# Patient Record
Sex: FEMALE | Race: WHITE | Employment: OTHER
[De-identification: names, ages, dates, MRNs, and addresses within clinical notes are randomized per-mention and may not be internally consistent; named-entity substitution may affect disease eponyms.]

---

## 2017-06-03 ENCOUNTER — HEALTH MAINTENANCE LETTER (OUTPATIENT)
Age: 37
End: 2017-06-03

## 2018-04-26 ENCOUNTER — TRANSFERRED RECORDS (OUTPATIENT)
Dept: HEALTH INFORMATION MANAGEMENT | Facility: CLINIC | Age: 38
End: 2018-04-26

## 2018-05-04 ENCOUNTER — ANESTHESIA EVENT (OUTPATIENT)
Dept: SURGERY | Facility: CLINIC | Age: 38
End: 2018-05-04

## 2018-05-04 ENCOUNTER — HOSPITAL ENCOUNTER (OUTPATIENT)
Dept: SURGERY | Facility: CLINIC | Age: 38
Discharge: HOME | End: 2018-05-04
Attending: PSYCHIATRY & NEUROLOGY | Admitting: PSYCHIATRY & NEUROLOGY
Payer: MEDICARE

## 2018-05-04 ENCOUNTER — ANESTHESIA (OUTPATIENT)
Dept: SURGERY | Facility: CLINIC | Age: 38
End: 2018-05-04

## 2018-05-04 VITALS
SYSTOLIC BLOOD PRESSURE: 101 MMHG | BODY MASS INDEX: 37.67 KG/M2 | OXYGEN SATURATION: 94 % | WEIGHT: 240 LBS | HEART RATE: 73 BPM | HEIGHT: 67 IN | TEMPERATURE: 97.5 F | RESPIRATION RATE: 21 BRPM | DIASTOLIC BLOOD PRESSURE: 78 MMHG

## 2018-05-04 DIAGNOSIS — F33.1 MODERATE EPISODE OF RECURRENT MAJOR DEPRESSIVE DISORDER (H): ICD-10-CM

## 2018-05-04 PROCEDURE — 25000128 H RX IP 250 OP 636: Performed by: PSYCHIATRY & NEUROLOGY

## 2018-05-04 PROCEDURE — 25000125 ZZHC RX 250: Performed by: NURSE ANESTHETIST, CERTIFIED REGISTERED

## 2018-05-04 PROCEDURE — 25000128 H RX IP 250 OP 636: Performed by: NURSE ANESTHETIST, CERTIFIED REGISTERED

## 2018-05-04 PROCEDURE — 37000008 ZZH ANESTHESIA TECHNICAL FEE, 1ST 30 MIN

## 2018-05-04 PROCEDURE — 25000128 H RX IP 250 OP 636: Performed by: ANESTHESIOLOGY

## 2018-05-04 PROCEDURE — 40000010 ZZH STATISTIC ANES STAT CODE-CRNA PER MINUTE

## 2018-05-04 PROCEDURE — 93005 ELECTROCARDIOGRAM TRACING: CPT

## 2018-05-04 PROCEDURE — 93010 ELECTROCARDIOGRAM REPORT: CPT | Performed by: INTERNAL MEDICINE

## 2018-05-04 PROCEDURE — 25000125 ZZHC RX 250: Performed by: ANESTHESIOLOGY

## 2018-05-04 PROCEDURE — 90870 ELECTROCONVULSIVE THERAPY: CPT

## 2018-05-04 RX ORDER — ONDANSETRON 2 MG/ML
4 INJECTION INTRAMUSCULAR; INTRAVENOUS
Status: CANCELLED
Start: 2018-05-04

## 2018-05-04 RX ORDER — KETOROLAC TROMETHAMINE 30 MG/ML
30 INJECTION, SOLUTION INTRAMUSCULAR; INTRAVENOUS
Status: CANCELLED
Start: 2018-05-04

## 2018-05-04 RX ORDER — KETOROLAC TROMETHAMINE 30 MG/ML
15 INJECTION, SOLUTION INTRAMUSCULAR; INTRAVENOUS ONCE
Status: COMPLETED | OUTPATIENT
Start: 2018-05-04 | End: 2018-05-04

## 2018-05-04 RX ORDER — DICYCLOMINE HYDROCHLORIDE 10 MG/1
10 CAPSULE ORAL EVERY EVENING
Status: ON HOLD | COMMUNITY
End: 2020-04-23

## 2018-05-04 RX ORDER — EPINEPHRINE 0.3 MG/.3ML
0.3 INJECTION SUBCUTANEOUS PRN
Status: ON HOLD | COMMUNITY
End: 2020-04-23

## 2018-05-04 RX ORDER — ONDANSETRON 2 MG/ML
4 INJECTION INTRAMUSCULAR; INTRAVENOUS
Status: DISCONTINUED | OUTPATIENT
Start: 2018-05-04 | End: 2018-05-05 | Stop reason: HOSPADM

## 2018-05-04 RX ORDER — SODIUM CHLORIDE, SODIUM LACTATE, POTASSIUM CHLORIDE, CALCIUM CHLORIDE 600; 310; 30; 20 MG/100ML; MG/100ML; MG/100ML; MG/100ML
500 INJECTION, SOLUTION INTRAVENOUS CONTINUOUS
Status: DISCONTINUED | OUTPATIENT
Start: 2018-05-04 | End: 2018-05-05 | Stop reason: HOSPADM

## 2018-05-04 RX ORDER — KETOROLAC TROMETHAMINE 30 MG/ML
30 INJECTION, SOLUTION INTRAMUSCULAR; INTRAVENOUS
Status: DISCONTINUED | OUTPATIENT
Start: 2018-05-04 | End: 2018-05-05 | Stop reason: HOSPADM

## 2018-05-04 RX ORDER — ALBUTEROL SULFATE 90 UG/1
2 AEROSOL, METERED RESPIRATORY (INHALATION) EVERY 4 HOURS PRN
Status: ON HOLD | COMMUNITY
End: 2020-04-23

## 2018-05-04 RX ORDER — FLUTICASONE PROPIONATE AND SALMETEROL XINAFOATE 45; 21 UG/1; UG/1
2 AEROSOL, METERED RESPIRATORY (INHALATION) DAILY
Status: ON HOLD | COMMUNITY
End: 2020-04-23

## 2018-05-04 RX ORDER — KETOROLAC TROMETHAMINE 15 MG/ML
15 INJECTION, SOLUTION INTRAMUSCULAR; INTRAVENOUS
Status: CANCELLED
Start: 2018-05-04

## 2018-05-04 RX ADMIN — ONDANSETRON 4 MG: 2 INJECTION INTRAMUSCULAR; INTRAVENOUS at 07:00

## 2018-05-04 RX ADMIN — SUCCINYLCHOLINE CHLORIDE 40 MG: 20 INJECTION, SOLUTION INTRAMUSCULAR; INTRAVENOUS; PARENTERAL at 06:58

## 2018-05-04 RX ADMIN — KETOROLAC TROMETHAMINE 15 MG: 30 INJECTION, SOLUTION INTRAMUSCULAR at 06:58

## 2018-05-04 RX ADMIN — LIDOCAINE HYDROCHLORIDE 1 ML: 10 INJECTION, SOLUTION EPIDURAL; INFILTRATION; INTRACAUDAL; PERINEURAL at 06:47

## 2018-05-04 RX ADMIN — METHOHEXITAL SODIUM 100 MG: 500 INJECTION, POWDER, LYOPHILIZED, FOR SOLUTION INTRAMUSCULAR; INTRAVENOUS; RECTAL at 06:55

## 2018-05-04 RX ADMIN — METHOHEXITAL SODIUM 40 MG: 500 INJECTION, POWDER, LYOPHILIZED, FOR SOLUTION INTRAMUSCULAR; INTRAVENOUS; RECTAL at 06:58

## 2018-05-04 RX ADMIN — SUCCINYLCHOLINE CHLORIDE 100 MG: 20 INJECTION, SOLUTION INTRAMUSCULAR; INTRAVENOUS; PARENTERAL at 06:55

## 2018-05-04 RX ADMIN — SODIUM CHLORIDE, POTASSIUM CHLORIDE, SODIUM LACTATE AND CALCIUM CHLORIDE 500 ML: 600; 310; 30; 20 INJECTION, SOLUTION INTRAVENOUS at 06:47

## 2018-05-04 ASSESSMENT — LIFESTYLE VARIABLES: TOBACCO_USE: 1

## 2018-05-04 NOTE — ANESTHESIA CARE TRANSFER NOTE
Patient: La Nena Newton    * No procedures listed *    Diagnosis: * No pre-op diagnosis entered *  Diagnosis Additional Information: No value filed.    Anesthesia Type:   General     Note:  Airway :Face Mask  Patient transferred to:PACU  Comments: Native airway general anesthetic.  Patient hyperventilated with 100% oxygen via mask prior to treatment.   Anesthesia induced using patent peripheral IV.    Bite block placed between molars to protect teeth and tongue.     After induction of seizure patient mask ventilated with 100% oxygen until spontaneous respirations returned.     At time of handoff to PACU, patient exhibited spontaneous respirations, adequate tidal volumes, airway patent. Oxygen via facemask at 8 liters per minute to PACU in cart with siderails up, connected to wall O2 in PACU. All monitors and alarms on and functioning. Report given to PACU RN and questions answered. Handoff Report: Identifed the Patient, Identified the Reponsible Provider, Reviewed the pertinent medical history, Discussed the surgical course, Reviewed Intra-OP anesthesia mangement and issues during anesthesia, Set expectations for post-procedure period and Allowed opportunity for questions and acknowledgement of understanding      Vitals: (Last set prior to Anesthesia Care Transfer)    CRNA VITALS  5/4/2018 0641 - 5/4/2018 0712      5/4/2018             Resp Rate (set): 10                Electronically Signed By: DONNA Alanis CRNA  May 4, 2018  7:12 AM

## 2018-05-04 NOTE — PROCEDURES
[facility] ECT Procedure Note     La Nena Newton [id number]   37 year old 1980     Patient Status: Outpatient    Allergies   Allergen Reactions     Compazine [Prochlorperazine] Unknown     Haldol [Haloperidol] Unknown     anxiety       Weight:  240 lbs 0 oz              Diagnosis:   Major depression       Indications for ECT:   History of good ECT response in one or more previous episodes of illness       Pause for the Cause:     Right patient Yes   Right procedure/laterality settings: Yes   Right diagnosis Yes          Intra-Procedure Documentation:     Date:  5/4/2018  Time:  6:37 AM    ECT #    Treatment number this series: 1   Total treatment number: 1   Type of ECT:  Bilateral, standard    ECT Medications administered:   Toradol 15 mg  Zofran 4 mg  Brevital 140 mg  Succinylcholine 140 mg         Clinical Narrative:     ECT was administered by Thymatron machine.  Pt has been medically cleared for procedure, consent signed. Side effects, Risks and benefits reviewed.    ECT Strip Summary:   Energy Level: 50 percent  Motor Seizure Duration: 30 seconds  EEG Seizure Duration: 30 seconds    Complications: No.  Going back with .  Arranged for family to be with her at home    Plan: 2nd ECT 5/7/18  Outpatient Psychiatrist: Dr Tasha Garcia

## 2018-05-04 NOTE — ANESTHESIA PREPROCEDURE EVALUATION
Anesthesia Evaluation     . Pt has had prior anesthetic. Type: General           ROS/MED HX    ENT/Pulmonary:     (+)tobacco use, Current use 0.25 packs/day  Moderate Persistent asthma Treatment: Inhaler daily,  , . .   (-) sleep apnea   Neurologic:  - neg neurologic ROS     Cardiovascular: Comment: ECG today: NSR    (+) Dyslipidemia, ----. : . . . :. .       METS/Exercise Tolerance:     Hematologic:  - neg hematologic  ROS       Musculoskeletal:  - neg musculoskeletal ROS       GI/Hepatic: Comment: Peptic ulcer disease    (+) GERD       Renal/Genitourinary:  - ROS Renal section negative       Endo:     (+) Obesity, .      Psychiatric:     (+) psychiatric history anxiety, depression and other (comment) (Schizoaffective d/o, PTSD, borderline personality d/o)      Infectious Disease:  - neg infectious disease ROS       Malignancy:         Other: Comment: S/p hysterectomy   (+) No chance of pregnancy                    Physical Exam      Airway   Mallampati: III  TM distance: >3 FB  Neck ROM: full    Dental   Comment: Poor dentition    Cardiovascular   Rhythm and rate: regular and normal      Pulmonary    breath sounds clear to auscultation                    Anesthesia Plan      History & Physical Review  History and physical reviewed and following examination; no interval change.    ASA Status:  3 .    NPO Status:  > 8 hours    Plan for General with Intravenous induction.   PONV prophylaxis:  Ondansetron (or other 5HT-3) and Dexamethasone or Solumedrol  Patient reports headache post-ECT  Copious secretions noted during and after ECT - recommend pretreatment with glycopyrrolate for future treatments      Postoperative Care      Consents  Anesthetic plan, risks, benefits and alternatives discussed with:  Patient..          Procedure:   Preop diagnosis: ECT

## 2018-05-04 NOTE — ANESTHESIA POSTPROCEDURE EVALUATION
Patient: La Nena Newton    ECT    Diagnosis:* No pre-op diagnosis entered *  Diagnosis Additional Information: No value filed.    Anesthesia Type:  General    Note:  Anesthesia Post Evaluation    Patient location during evaluation: Bedside  Patient participation: Able to fully participate in evaluation  Level of consciousness: awake and alert  Pain management: adequate  Airway patency: patent  Cardiovascular status: acceptable  Respiratory status: acceptable  Hydration status: acceptable  PONV: none       Comments: Given copious secretions, recommend pretreatment with glycopyrrolate        Last vitals:  Vitals:    05/04/18 0735 05/04/18 0740 05/04/18 0745   BP: 99/44 94/68 101/78   Pulse:      Resp: 21     Temp:      SpO2: 92% 95% 94%         Electronically Signed By: Jonas Teran MD  May 4, 2018  7:59 AM

## 2018-05-04 NOTE — OR NURSING
Patient tolerated procedure well. Discharged to [facility] transport service arranged by patient's care coordinator. Discharged in stable condition

## 2018-05-07 ENCOUNTER — ANESTHESIA (OUTPATIENT)
Dept: SURGERY | Facility: CLINIC | Age: 38
End: 2018-05-07

## 2018-05-07 ENCOUNTER — HOSPITAL ENCOUNTER (OUTPATIENT)
Dept: SURGERY | Facility: CLINIC | Age: 38
Discharge: HOME | End: 2018-05-07
Attending: PSYCHIATRY & NEUROLOGY | Admitting: PSYCHIATRY & NEUROLOGY
Payer: MEDICARE

## 2018-05-07 ENCOUNTER — ANESTHESIA EVENT (OUTPATIENT)
Dept: SURGERY | Facility: CLINIC | Age: 38
End: 2018-05-07

## 2018-05-07 VITALS
DIASTOLIC BLOOD PRESSURE: 67 MMHG | OXYGEN SATURATION: 96 % | RESPIRATION RATE: 12 BRPM | SYSTOLIC BLOOD PRESSURE: 115 MMHG | HEART RATE: 78 BPM

## 2018-05-07 DIAGNOSIS — F33.1 MODERATE EPISODE OF RECURRENT MAJOR DEPRESSIVE DISORDER (H): ICD-10-CM

## 2018-05-07 PROCEDURE — 37000008 ZZH ANESTHESIA TECHNICAL FEE, 1ST 30 MIN

## 2018-05-07 PROCEDURE — 90870 ELECTROCONVULSIVE THERAPY: CPT

## 2018-05-07 PROCEDURE — 25000128 H RX IP 250 OP 636: Performed by: NURSE ANESTHETIST, CERTIFIED REGISTERED

## 2018-05-07 PROCEDURE — 25000128 H RX IP 250 OP 636: Performed by: PSYCHIATRY & NEUROLOGY

## 2018-05-07 PROCEDURE — 40000010 ZZH STATISTIC ANES STAT CODE-CRNA PER MINUTE

## 2018-05-07 PROCEDURE — 25000125 ZZHC RX 250: Performed by: NURSE ANESTHETIST, CERTIFIED REGISTERED

## 2018-05-07 PROCEDURE — 25000128 H RX IP 250 OP 636: Performed by: ANESTHESIOLOGY

## 2018-05-07 RX ORDER — KETOROLAC TROMETHAMINE 15 MG/ML
15 INJECTION, SOLUTION INTRAMUSCULAR; INTRAVENOUS
Status: CANCELLED
Start: 2018-05-07

## 2018-05-07 RX ORDER — KETOROLAC TROMETHAMINE 30 MG/ML
30 INJECTION, SOLUTION INTRAMUSCULAR; INTRAVENOUS
Status: CANCELLED
Start: 2018-05-07

## 2018-05-07 RX ORDER — ONDANSETRON 2 MG/ML
4 INJECTION INTRAMUSCULAR; INTRAVENOUS
Status: DISCONTINUED | OUTPATIENT
Start: 2018-05-07 | End: 2018-05-08 | Stop reason: HOSPADM

## 2018-05-07 RX ORDER — KETOROLAC TROMETHAMINE 15 MG/ML
15 INJECTION, SOLUTION INTRAMUSCULAR; INTRAVENOUS
Status: DISCONTINUED | OUTPATIENT
Start: 2018-05-07 | End: 2018-05-08 | Stop reason: HOSPADM

## 2018-05-07 RX ORDER — ONDANSETRON 2 MG/ML
4 INJECTION INTRAMUSCULAR; INTRAVENOUS
Status: CANCELLED
Start: 2018-05-07

## 2018-05-07 RX ORDER — SODIUM CHLORIDE, SODIUM LACTATE, POTASSIUM CHLORIDE, CALCIUM CHLORIDE 600; 310; 30; 20 MG/100ML; MG/100ML; MG/100ML; MG/100ML
500 INJECTION, SOLUTION INTRAVENOUS CONTINUOUS
Status: DISCONTINUED | OUTPATIENT
Start: 2018-05-07 | End: 2018-05-08 | Stop reason: HOSPADM

## 2018-05-07 RX ADMIN — ONDANSETRON 4 MG: 2 INJECTION INTRAMUSCULAR; INTRAVENOUS at 06:13

## 2018-05-07 RX ADMIN — SODIUM CHLORIDE, POTASSIUM CHLORIDE, SODIUM LACTATE AND CALCIUM CHLORIDE 500 ML: 600; 310; 30; 20 INJECTION, SOLUTION INTRAVENOUS at 06:14

## 2018-05-07 RX ADMIN — KETOROLAC TROMETHAMINE 15 MG: 15 INJECTION, SOLUTION INTRAMUSCULAR; INTRAVENOUS at 06:14

## 2018-05-07 RX ADMIN — METHOHEXITAL SODIUM 100 MG: 500 INJECTION, POWDER, LYOPHILIZED, FOR SOLUTION INTRAMUSCULAR; INTRAVENOUS; RECTAL at 06:33

## 2018-05-07 RX ADMIN — SUCCINYLCHOLINE CHLORIDE 100 MG: 20 INJECTION, SOLUTION INTRAMUSCULAR; INTRAVENOUS; PARENTERAL at 06:33

## 2018-05-07 ASSESSMENT — LIFESTYLE VARIABLES: TOBACCO_USE: 1

## 2018-05-07 ASSESSMENT — ENCOUNTER SYMPTOMS: SEIZURES: 0

## 2018-05-07 NOTE — ANESTHESIA PREPROCEDURE EVALUATION
Anesthesia Evaluation     . Pt has had prior anesthetic. Type: General    No history of anesthetic complications          ROS/MED HX    ENT/Pulmonary:     (+)tobacco use, Current use 0.25 packs/day  Moderate Persistent asthma Treatment: Inhaler daily,  , . .   (-) sleep apnea   Neurologic:      (-) seizures and migraines   Cardiovascular: Comment: ECG today: NSR    (+) Dyslipidemia, ----. : . . . :. .       METS/Exercise Tolerance:     Hematologic:         Musculoskeletal:         GI/Hepatic: Comment: Peptic ulcer disease    (+) GERD      (-) liver disease   Renal/Genitourinary:      (-) renal disease   Endo:     (+) Obesity, .      Psychiatric:     (+) psychiatric history anxiety, depression and other (comment) (Schizoaffective d/o, PTSD, borderline personality d/o)      Infectious Disease:         Malignancy:         Other: Comment: S/p hysterectomy   (+) No chance of pregnancy                    Physical Exam      Airway   Mallampati: III  TM distance: >3 FB  Neck ROM: full    Dental   Comment: Poor dentition    Cardiovascular   Rhythm and rate: regular and normal      Pulmonary    breath sounds clear to auscultation                        Anesthesia Plan      History & Physical Review  History and physical reviewed and following examination; no interval change.    ASA Status:  3 .    NPO Status:  > 8 hours    Plan for General with Intravenous induction.   PONV prophylaxis:  Ondansetron (or other 5HT-3)       Postoperative Care      Consents  Anesthetic plan, risks, benefits and alternatives discussed with:  Patient..          Procedure: ECT  Preop diagnosis: * No pre-op diagnosis entered *    Allergies   Allergen Reactions     Compazine [Prochlorperazine] Unknown     Haldol [Haloperidol] Unknown     anxiety     No past medical history on file.  No past surgical history on file.  Prior to Admission medications    Medication Sig Start Date End Date Taking? Authorizing Provider   ACIDDAWOOD LACTOBACILLUS PO Take  1 capsule by mouth daily    Reported, Patient   albuterol (PROAIR HFA/PROVENTIL HFA/VENTOLIN HFA) 108 (90 Base) MCG/ACT Inhaler Inhale 2 puffs into the lungs every 4 hours as needed for wheezing    Reported, Patient   ATORVASTATIN CALCIUM PO Take 20 mg by mouth daily     Reported, Patient   CYANOCOBALAMIN PO Place 5,000 mcg under the tongue daily    Reported, Patient   DICYCLOMINE HCL PO Take 10 mg by mouth 2 times daily    Reported, Patient   EPINEPHrine (EPIPEN/ADRENACLICK/OR ANY BX GENERIC EQUIV) 0.3 MG/0.3ML injection 2-pack Inject 0.3 mg into the muscle as needed for anaphylaxis Up to one dose.    Reported, Patient   fluticasone-salmeterol (ADVAIR-HFA) 45-21 MCG/ACT inhaler Inhale 2 puffs into the lungs 2 times daily    Reported, Patient   melatonin (MELATONIN) 1 MG/ML LIQD liquid Take 10 mg by mouth nightly as needed for sleep    Reported, Patient   OMEPRAZOLE PO Take 20 mg by mouth Take once daily before a meal.    Reported, Patient     Current Outpatient Prescriptions Ordered in [facility]   Medication     ACIDOPHILUS LACTOBACILLUS PO     albuterol (PROAIR HFA/PROVENTIL HFA/VENTOLIN HFA) 108 (90 Base) MCG/ACT Inhaler     ATORVASTATIN CALCIUM PO     CYANOCOBALAMIN PO     DICYCLOMINE HCL PO     EPINEPHrine (EPIPEN/ADRENACLICK/OR ANY BX GENERIC EQUIV) 0.3 MG/0.3ML injection 2-pack     fluticasone-salmeterol (ADVAIR-HFA) 45-21 MCG/ACT inhaler     melatonin (MELATONIN) 1 MG/ML LIQD liquid     OMEPRAZOLE PO     Current Facility-Administered Medications Ordered in Epic   Medication Dose Route Frequency Last Rate Last Dose     ketorolac (TORADOL) injection 15 mg  15 mg Intravenous Q Mon Wed Fri AM   15 mg at 05/07/18 0614     lactated ringers infusion  500 mL Intravenous Continuous 25 mL/hr at 05/07/18 0614 500 mL at 05/07/18 0614     methohexital (BREVITAL SODIUM) injection    PRN   100 mg at 05/07/18 0633     ondansetron (ZOFRAN) injection 4 mg  4 mg Intravenous Q Mon Wed Fri AM   4 mg at 05/07/18 0613      succinylcholine (ANECTINE) injection    PRN   100 mg at 05/07/18 0633     Wt Readings from Last 1 Encounters:   05/04/18 108.9 kg (240 lb)     Temp Readings from Last 1 Encounters:   05/04/18 36.4  C (97.5  F) (Temporal)     BP Readings from Last 6 Encounters:   05/07/18 114/69   05/04/18 101/78     Pulse Readings from Last 4 Encounters:   05/07/18 78   05/04/18 73     Resp Readings from Last 1 Encounters:   05/07/18 20     SpO2 Readings from Last 1 Encounters:   05/07/18 94%

## 2018-05-07 NOTE — PROCEDURES
[facility] ECT Procedure Note     La Nena Newton [id number]   37 year old 1980     Patient Status: Outpatient    Allergies   Allergen Reactions     Compazine [Prochlorperazine] Unknown     Haldol [Haloperidol] Unknown     anxiety       Weight:  0 lbs 0 oz              Diagnosis:   Major depression       Indications for ECT:   History of good ECT response in one or more previous episodes of illness       Pause for the Cause:     Right patient Yes   Right procedure/laterality settings: Yes   Right diagnosis Yes          Intra-Procedure Documentation:     Date:  5/7/2018  Time:  6:37 AM    ECT #    Treatment number this series: 2   Total treatment number: 2   Type of ECT:  Bilateral, standard    ECT Medications administered:   Toradol 30 mg  Zofran 4 mg  Brevital 140 mg  Succinylcholine 140 mg         Clinical Narrative:     ECT was administered by Thymatron machine.  Pt has been medically cleared for procedure, consent signed. Side effects, Risks and benefits reviewed.    ECT Strip Summary:   Energy Level: 60 percent  Motor Seizure Duration: 30 seconds  EEG Seizure Duration: 30 seconds    Complications: No.  Going back with .  Arranged for family to be with her at home    Plan: 3rd ECT 5/9/18  Outpatient Psychiatrist: Dr Tasha Garcia

## 2018-05-07 NOTE — OR NURSING
Patient tolerated procedure well. Discharged in stable condition to medical  provided by [employer] insurance. Patient will return Wednesday for further treatment

## 2018-05-07 NOTE — ANESTHESIA POSTPROCEDURE EVALUATION
Patient: La Nena Newton    * No procedures listed *    Diagnosis:* No pre-op diagnosis entered *  Diagnosis Additional Information: No value filed.    Anesthesia Type:  General    Note:  Anesthesia Post Evaluation    Patient location during evaluation: PACU  Patient participation: Able to fully participate in evaluation  Level of consciousness: awake and alert  Pain management: satisfactory to patient  Airway patency: patent  Cardiovascular status: hemodynamically stable  Respiratory status: acceptable and unassisted  Hydration status: balanced  PONV: none     Anesthetic complications: None          Last vitals:  Vitals:    05/07/18 0705 05/07/18 0710 05/07/18 0715   BP: 109/67 111/69 115/67   Pulse:      Resp: 12 22 12   SpO2: 96% 94% 96%         Electronically Signed By: Anil Ramírez MD  May 7, 2018  7:37 AM

## 2018-05-08 LAB — INTERPRETATION ECG - MUSE: NORMAL

## 2020-04-22 ENCOUNTER — HOSPITAL ENCOUNTER (INPATIENT)
Facility: CLINIC | Age: 40
LOS: 1 days | Discharge: HOME | DRG: 885 | End: 2020-04-23
Attending: EMERGENCY MEDICINE | Admitting: PSYCHIATRY & NEUROLOGY
Payer: MEDICARE

## 2020-04-22 ENCOUNTER — TELEPHONE (OUTPATIENT)
Dept: BEHAVIORAL HEALTH | Facility: CLINIC | Age: 40
End: 2020-04-22

## 2020-04-22 DIAGNOSIS — F40.00: ICD-10-CM

## 2020-04-22 DIAGNOSIS — F33.3 SEVERE RECURRENT MAJOR DEPRESSION WITH PSYCHOTIC FEATURES (H): ICD-10-CM

## 2020-04-22 DIAGNOSIS — R45.851 SUICIDAL IDEATION: ICD-10-CM

## 2020-04-22 DIAGNOSIS — J45.909 UNCOMPLICATED ASTHMA, UNSPECIFIED ASTHMA SEVERITY, UNSPECIFIED WHETHER PERSISTENT: Primary | ICD-10-CM

## 2020-04-22 DIAGNOSIS — F33.1 MAJOR DEPRESSIVE DISORDER, RECURRENT EPISODE, MODERATE (H): ICD-10-CM

## 2020-04-22 DIAGNOSIS — F43.10 POSTTRAUMATIC STRESS DISORDER: ICD-10-CM

## 2020-04-22 DIAGNOSIS — Z91.030 ALLERGY TO HONEY BEE VENOM: ICD-10-CM

## 2020-04-22 PROCEDURE — 40000275 ZZH STATISTIC RCP TIME EA 10 MIN

## 2020-04-22 PROCEDURE — 25000132 ZZH RX MED GY IP 250 OP 250 PS 637: Performed by: STUDENT IN AN ORGANIZED HEALTH CARE EDUCATION/TRAINING PROGRAM

## 2020-04-22 PROCEDURE — 25000125 ZZHC RX 250: Performed by: STUDENT IN AN ORGANIZED HEALTH CARE EDUCATION/TRAINING PROGRAM

## 2020-04-22 PROCEDURE — 99285 EMERGENCY DEPT VISIT HI MDM: CPT | Mod: 25 | Performed by: EMERGENCY MEDICINE

## 2020-04-22 PROCEDURE — 90791 PSYCH DIAGNOSTIC EVALUATION: CPT

## 2020-04-22 PROCEDURE — 25000132 ZZH RX MED GY IP 250 OP 250 PS 637: Mod: GY | Performed by: STUDENT IN AN ORGANIZED HEALTH CARE EDUCATION/TRAINING PROGRAM

## 2020-04-22 PROCEDURE — 99285 EMERGENCY DEPT VISIT HI MDM: CPT | Mod: Z6 | Performed by: EMERGENCY MEDICINE

## 2020-04-22 PROCEDURE — 25000132 ZZH RX MED GY IP 250 OP 250 PS 637: Performed by: FAMILY MEDICINE

## 2020-04-22 PROCEDURE — 12400001 ZZH R&B MH UMMC

## 2020-04-22 PROCEDURE — 94640 AIRWAY INHALATION TREATMENT: CPT

## 2020-04-22 RX ORDER — HYDROXYZINE HYDROCHLORIDE 25 MG/1
25 TABLET, FILM COATED ORAL EVERY 6 HOURS PRN
Status: DISCONTINUED | OUTPATIENT
Start: 2020-04-22 | End: 2020-04-23 | Stop reason: HOSPADM

## 2020-04-22 RX ORDER — LISINOPRIL AND HYDROCHLOROTHIAZIDE 20; 25 MG/1; MG/1
1 TABLET ORAL DAILY
Status: ON HOLD | COMMUNITY
Start: 2020-03-17 | End: 2020-04-23

## 2020-04-22 RX ORDER — MECOBALAMIN 5000 MCG
15 TABLET,DISINTEGRATING ORAL
Status: DISCONTINUED | OUTPATIENT
Start: 2020-04-23 | End: 2020-04-22

## 2020-04-22 RX ORDER — FLUTICASONE PROPIONATE AND SALMETEROL XINAFOATE 45; 21 UG/1; UG/1
2 AEROSOL, METERED RESPIRATORY (INHALATION) DAILY
Status: DISCONTINUED | OUTPATIENT
Start: 2020-04-22 | End: 2020-04-23 | Stop reason: HOSPADM

## 2020-04-22 RX ORDER — L. ACIDOPHILUS/PECTIN, CITRUS 25MM-100MG
1 TABLET ORAL
Status: DISCONTINUED | OUTPATIENT
Start: 2020-04-22 | End: 2020-04-22

## 2020-04-22 RX ORDER — LORAZEPAM 1 MG/1
1 TABLET ORAL
Status: DISCONTINUED | OUTPATIENT
Start: 2020-04-22 | End: 2020-04-23 | Stop reason: HOSPADM

## 2020-04-22 RX ORDER — ALBUTEROL SULFATE 0.83 MG/ML
2.5 SOLUTION RESPIRATORY (INHALATION) EVERY 6 HOURS PRN
Status: DISCONTINUED | OUTPATIENT
Start: 2020-04-22 | End: 2020-04-23 | Stop reason: HOSPADM

## 2020-04-22 RX ORDER — LORAZEPAM 0.5 MG/1
0.5 TABLET ORAL EVERY 4 HOURS PRN
Status: DISCONTINUED | OUTPATIENT
Start: 2020-04-22 | End: 2020-04-22

## 2020-04-22 RX ORDER — NORETHINDRONE ACETATE AND ETHINYL ESTRADIOL 1MG-20(21)
1 KIT ORAL DAILY
Status: ON HOLD | COMMUNITY
Start: 2020-03-15 | End: 2020-04-23

## 2020-04-22 RX ORDER — LACTOBACILLUS RHAMNOSUS GG 10B CELL
1 CAPSULE ORAL DAILY
Status: DISCONTINUED | OUTPATIENT
Start: 2020-04-22 | End: 2020-04-23 | Stop reason: HOSPADM

## 2020-04-22 RX ORDER — ATORVASTATIN CALCIUM 40 MG/1
40 TABLET, FILM COATED ORAL DAILY
Status: ON HOLD | COMMUNITY
Start: 2020-03-17 | End: 2020-04-23

## 2020-04-22 RX ORDER — NICOTINE 21 MG/24HR
1 PATCH, TRANSDERMAL 24 HOURS TRANSDERMAL DAILY
Status: DISCONTINUED | OUTPATIENT
Start: 2020-04-22 | End: 2020-04-23 | Stop reason: HOSPADM

## 2020-04-22 RX ORDER — HYDROXYZINE HYDROCHLORIDE 50 MG/1
50 TABLET, FILM COATED ORAL EVERY 6 HOURS PRN
Status: DISCONTINUED | OUTPATIENT
Start: 2020-04-22 | End: 2020-04-23 | Stop reason: HOSPADM

## 2020-04-22 RX ORDER — ATORVASTATIN CALCIUM 20 MG/1
40 TABLET, FILM COATED ORAL DAILY
Status: DISCONTINUED | OUTPATIENT
Start: 2020-04-22 | End: 2020-04-23

## 2020-04-22 RX ORDER — IBUPROFEN 600 MG/1
600 TABLET, FILM COATED ORAL ONCE
Status: COMPLETED | OUTPATIENT
Start: 2020-04-22 | End: 2020-04-22

## 2020-04-22 RX ORDER — ALBUTEROL SULFATE 90 UG/1
2 AEROSOL, METERED RESPIRATORY (INHALATION) EVERY 4 HOURS PRN
Status: DISCONTINUED | OUTPATIENT
Start: 2020-04-22 | End: 2020-04-22

## 2020-04-22 RX ORDER — LISINOPRIL AND HYDROCHLOROTHIAZIDE 20; 25 MG/1; MG/1
1 TABLET ORAL DAILY
Status: DISCONTINUED | OUTPATIENT
Start: 2020-04-22 | End: 2020-04-22

## 2020-04-22 RX ORDER — HYDROXYZINE HYDROCHLORIDE 25 MG/1
25 TABLET, FILM COATED ORAL EVERY 4 HOURS PRN
Status: DISCONTINUED | OUTPATIENT
Start: 2020-04-22 | End: 2020-04-22

## 2020-04-22 RX ORDER — LACTOBACILLUS RHAMNOSUS GG 10B CELL
1 CAPSULE ORAL DAILY
COMMUNITY
End: 2021-03-31 | Stop reason: ALTCHOICE

## 2020-04-22 RX ORDER — NORETHINDRONE ACETATE AND ETHINYL ESTRADIOL 1MG-20(21)
1 KIT ORAL DAILY
Status: DISCONTINUED | OUTPATIENT
Start: 2020-04-22 | End: 2020-04-22

## 2020-04-22 RX ADMIN — IBUPROFEN 600 MG: 600 TABLET ORAL at 11:20

## 2020-04-22 RX ADMIN — HYDROXYZINE HYDROCHLORIDE 50 MG: 50 TABLET, FILM COATED ORAL at 21:53

## 2020-04-22 RX ADMIN — ATORVASTATIN CALCIUM 40 MG: 20 TABLET, FILM COATED ORAL at 14:09

## 2020-04-22 RX ADMIN — LORAZEPAM 0.5 MG: 0.5 TABLET ORAL at 14:09

## 2020-04-22 RX ADMIN — NICOTINE POLACRILEX 2 MG: 2 GUM, CHEWING BUCCAL at 13:39

## 2020-04-22 RX ADMIN — NICOTINE POLACRILEX 2 MG: 2 GUM, CHEWING BUCCAL at 18:44

## 2020-04-22 RX ADMIN — OMEPRAZOLE 20 MG: 20 CAPSULE, DELAYED RELEASE ORAL at 20:37

## 2020-04-22 RX ADMIN — NICOTINE POLACRILEX 2 MG: 2 GUM, CHEWING BUCCAL at 20:37

## 2020-04-22 RX ADMIN — NICOTINE 1 PATCH: 14 PATCH, EXTENDED RELEASE TRANSDERMAL at 13:39

## 2020-04-22 RX ADMIN — ALBUTEROL SULFATE 2.5 MG: 2.5 SOLUTION RESPIRATORY (INHALATION) at 21:36

## 2020-04-22 ASSESSMENT — ACTIVITIES OF DAILY LIVING (ADL)
ORAL_HYGIENE: INDEPENDENT
TOILETING: 0-->INDEPENDENT
ORAL_HYGIENE: INDEPENDENT
BATHING: 0-->INDEPENDENT
DRESS: STREET CLOTHES;INDEPENDENT
TRANSFERRING: 0-->INDEPENDENT
FALL_HISTORY_WITHIN_LAST_SIX_MONTHS: NO
SWALLOWING: 0-->SWALLOWS FOODS/LIQUIDS WITHOUT DIFFICULTY
DRESS: 0-->INDEPENDENT
COGNITION: 0 - NO COGNITION ISSUES REPORTED
RETIRED_COMMUNICATION: 0-->UNDERSTANDS/COMMUNICATES WITHOUT DIFFICULTY
HYGIENE/GROOMING: INDEPENDENT
HYGIENE/GROOMING: INDEPENDENT
PRIOR_FUNCTIONAL_LEVEL_COMMENT: WNL
DRESS: SCRUBS (BEHAVIORAL HEALTH);INDEPENDENT
RETIRED_EATING: 0-->INDEPENDENT
AMBULATION: 0-->INDEPENDENT
LAUNDRY: WITH SUPERVISION

## 2020-04-22 ASSESSMENT — ENCOUNTER SYMPTOMS
HALLUCINATIONS: 1
ARTHRALGIAS: 0
NERVOUS/ANXIOUS: 1
ABDOMINAL PAIN: 0
CHEST TIGHTNESS: 0
DIAPHORESIS: 0
HEMATURIA: 0
NAUSEA: 0
TREMORS: 0
SEIZURES: 0
VOMITING: 0
APPETITE CHANGE: 0
COUGH: 0
BRUISES/BLEEDS EASILY: 0
RHINORRHEA: 0
CHILLS: 0
DYSPHORIC MOOD: 1
PALPITATIONS: 0
HEADACHES: 0
DYSURIA: 0
CONFUSION: 0
SORE THROAT: 0
FATIGUE: 1
DIZZINESS: 0
DIFFICULTY URINATING: 0
FEVER: 0
SHORTNESS OF BREATH: 0
AGITATION: 0
LIGHT-HEADEDNESS: 0
DIARRHEA: 0

## 2020-04-22 NOTE — PHARMACY-ADMISSION MEDICATION HISTORY
Admission medication history for the April 22, 2020 admission is complete.     Interview Sources:  Patient, Surescripts Prescription filling, [facility] ([phone])    Reliability of Source: Poor - see additional information below    Medication Adherence: Unable to assess    Current Outpatient Pharmacy: [facility]    Changes made to PTA medication list (reason)  Added: none  Deleted:  Dicyclomine (one time fill for one tablet in 07/16/2019), EpiPen (unable to find where patient is filling his prescription)    These medications were last filled at [facility] in August 2019 so the patient likely ran out and no longer compliant.  1. Albuterol 1-2 puffs inhaled Q4 hours PRN  2. Advair 45/21 2 puffs daily (should be BID but she had reported only using daily)  3. Omeprazole 20 mg daily (needs capsules not tablets per patient)       False [employer] information (these prescription fills were attributed to the patient by her insurance company records but the [facility] has no record of filling these prescriptions for the patient)  1. Atorvastatin 40 mg daily  2. Lisinopril/HCTZ 20/25 mg daily  3. Blisovi FE 1/20 1 tablet daily  4. Metfromin 500 mg daily      Changed: cyanocobalamin -> 2500 mcg daily, melatonin -> 5 mg at bedtime PRN    Additional medication history information:   The patient was not a reliable historian. She was vague about details about medications so unable to really understand what medications she is/was taking at home. When questioned about compliance she really did not answer clearly and would become irritated when asked further questions.    Spoke with pharmacist at [facility] but reported the patient has not filled any medications there since August 2019. The patient has likely ran out of these medications because these fills were about 8 months ago.  1. Albuterol 1-2 puffs inhaled Q4 hours PRN  2. Advair 45/21 2 puffs daily (should be BID but she had  reported only using daily)  3. Omeprazole 20 mg daily (filled for quantity 90) (needs capsules not tablets per patient)  4. Wellbutrin  mg daily (filled for quantity 30)    Consider resuming her Albuterol, omeprazole and Advair. The patient likely needs an EpiPen for her allergy to bees. The patient will likely also need a medication for her elevated total cholesterol and LDL.    Prior to Admission Medication List:  Prior to Admission Medications   Prescriptions Last Dose Informant Patient Reported? Taking?   Cyanocobalamin 2500 MCG TABS unknown  Yes Yes   Sig: Place 2,500 mcg under the tongue daily    lactobacillus rhamnosus, GG, (CULTURELL) capsule unknown  Yes Yes   Sig: Take 1 capsule by mouth daily   melatonin 5 MG tablet unknown  Yes Yes   Sig: Take 5 mg by mouth nightly as needed for sleep          Time spent: 60 minutes    Medication history completed by:   Marya Escobar, PharmD, BCPP  Behavioral Health Pharmacist  [facility])  [facility] Ascom: *[phone] or [phone]  [facility] Phone: [phone]

## 2020-04-22 NOTE — PROGRESS NOTES
Sent to Security Envelope# [id number]:    Cash: 1-$20 bill  Paypal debit card: [id number]    Kept in Locker:     Clothes  Slippers  Journal  Cigarettes  Phone,   Key chain  Hair brush       04/22/20 6784   Patient Belongings   Did you bring any home meds/supplements to the hospital?  No   Patient Belongings locker;sent to security per site process;remains with patient   Patient Belongings Remaining with Patient clothing   Patient Belongings Put in Hospital Secure Location (Security or Locker, etc.) cash/credit card;money (see comment);purse/wallet;tote;shoes;cell phone/electronics;clothing   Belongings Search Yes   Clothing Search Yes   Second Staff Brandy GODFREY               Admission:  I am responsible for any personal items that are not sent to the safe or pharmacy.  [facility] is not responsible for loss, theft or damage of any property in my possession.    Signature:  _________________________________ Date: _______  Time: _____                                              Staff Signature:  ____________________________ Date: ________  Time: _____      2nd Staff person, if patient is unable/unwilling to sign:    Signature: ________________________________ Date: ________  Time: _____     Discharge:  [facility] has returned all of my personal belongings:    Signature: _________________________________ Date: ________  Time: _____                                          Staff Signature:  ____________________________ Date: ________  Time: _____

## 2020-04-22 NOTE — ED PROVIDER NOTES
ED Provider Note  [facility]      History     Chief Complaint   Patient presents with     Mental Health Problem     depressed wants to be seen for ECT/SI     HPI  La Nena Newton is a 39 year old female with history of depression, borderline personality disorder, and PTSD who presents with escalating symptoms of depression over past several weeks. She now has auditory hallucinations and suicidal ideation. No specific suicidal plan. She has not taken medications for approximately one year; she states the medications do not help and she is requesting admission for ECT. The auditory hallucinations she describes as derogatory voices. No recent illness or injury. Occasional marijuana use. No alcohol use. Denies other drug use. She has had successful treatment with ECT in past.    Past Medical History  Past Medical History:   Diagnosis Date     Agoraphobia      Borderline personality disorder (H)      Depressive disorder      PTSD (post-traumatic stress disorder)      History reviewed. No pertinent surgical history.  ACIDOPHILUS LACTOBACILLUS PO  albuterol (PROAIR HFA/PROVENTIL HFA/VENTOLIN HFA) 108 (90 Base) MCG/ACT Inhaler  ATORVASTATIN CALCIUM PO  CYANOCOBALAMIN PO  DICYCLOMINE HCL PO  EPINEPHrine (EPIPEN/ADRENACLICK/OR ANY BX GENERIC EQUIV) 0.3 MG/0.3ML injection 2-pack  fluticasone-salmeterol (ADVAIR-HFA) 45-21 MCG/ACT inhaler  melatonin (MELATONIN) 1 MG/ML LIQD liquid  OMEPRAZOLE PO      Allergies   Allergen Reactions     Compazine [Prochlorperazine] Unknown     Haldol [Haloperidol] Unknown     anxiety     Past medical history, past surgical history, medications, and allergies were reviewed with the patient. Additional pertinent items: None    Family History  History reviewed. No pertinent family history.  Family history was reviewed with the patient. Additional pertinent items: None    Social History  Social History     Tobacco Use     Smoking status: Current Every Day Smoker     Types:  Cigarettes     Smokeless tobacco: Never Used   Substance Use Topics     Alcohol use: Not Currently     Drug use: Yes     Types: Marijuana      Social history was reviewed with the patient. Additional pertinent items: None    Review of Systems   Constitutional: Positive for fatigue. Negative for appetite change, chills, diaphoresis and fever.   HENT: Negative for congestion, rhinorrhea and sore throat.    Eyes: Negative for visual disturbance.   Respiratory: Negative for cough, chest tightness and shortness of breath.    Cardiovascular: Negative for chest pain, palpitations and leg swelling.   Gastrointestinal: Negative for abdominal pain, diarrhea, nausea and vomiting.   Genitourinary: Negative for difficulty urinating, dysuria, hematuria and pelvic pain.   Musculoskeletal: Negative for arthralgias.   Allergic/Immunologic: Negative for immunocompromised state.   Neurological: Negative for dizziness, tremors, seizures, syncope, light-headedness and headaches.   Hematological: Does not bruise/bleed easily.   Psychiatric/Behavioral: Positive for dysphoric mood, hallucinations and suicidal ideas. Negative for agitation, confusion and self-injury. The patient is nervous/anxious.    All other systems reviewed and are negative.    Physical Exam   BP: 111/68  Pulse: 88  Temp: 97.4  F (36.3  C)  Resp: 16  SpO2: 98 %  Physical Exam  Vitals signs and nursing note reviewed.   Constitutional:       General: She is not in acute distress.     Appearance: She is not ill-appearing or diaphoretic.   HENT:      Head: Normocephalic and atraumatic.      Right Ear: External ear normal.      Left Ear: External ear normal.      Nose: Nose normal.      Mouth/Throat:      Mouth: Mucous membranes are moist.      Pharynx: No oropharyngeal exudate.   Eyes:      General: No scleral icterus.     Extraocular Movements: Extraocular movements intact.      Conjunctiva/sclera: Conjunctivae normal.      Pupils: Pupils are equal, round, and reactive to  light.   Neck:      Musculoskeletal: Normal range of motion and neck supple. No neck rigidity.   Cardiovascular:      Rate and Rhythm: Normal rate and regular rhythm.      Pulses: Normal pulses.      Heart sounds: Normal heart sounds.   Pulmonary:      Effort: Pulmonary effort is normal. No respiratory distress.      Breath sounds: Normal breath sounds.   Abdominal:      General: Bowel sounds are normal.      Palpations: Abdomen is soft.      Tenderness: There is no abdominal tenderness.   Musculoskeletal: Normal range of motion.         General: No swelling or tenderness.   Skin:     General: Skin is warm and dry.      Findings: No rash.   Neurological:      General: No focal deficit present.      Mental Status: She is alert and oriented to person, place, and time.   Psychiatric:         Attention and Perception: Attention normal. She perceives auditory hallucinations. She does not perceive visual hallucinations.         Mood and Affect: Mood is depressed.         Speech: Speech normal.         Behavior: Behavior is cooperative.         Thought Content: Thought content is not paranoid. Thought content includes suicidal ideation. Thought content does not include homicidal ideation. Thought content does not include suicidal plan.         ED Course      Procedures                         No results found for any visits on 04/22/20.  Medications - No data to display     Assessments & Plan (with Medical Decision Making)   Suicidal with auditory hallucinations. She wants to be admitted for ECT. Notified intake. Will await mental health  assessment. Anticipate need for admission.    I have reviewed the nursing notes. I have reviewed the findings, diagnosis, plan and need for follow up with the patient.    New Prescriptions    No medications on file       Final diagnoses:   Suicidal ideation       --  Jez Flores MD   Emergency Medicine   [facility], [place], EMERGENCY DEPARTMENT  4/22/2020     Jez Flores,  MD  04/22/20 0709
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
## 2020-04-22 NOTE — ED NOTES
ED to Behavioral Floor Handoff    SITUATION  La Nena Newton is a 39 year old female who speaks English and lives home status is unknown unknown The patient arrived in the ED by walking from emergency room with a complaint of Mental Health Problem (depressed wants to be seen for ECT/SI)  .The patient's current symptoms started/worsened 1 day(s) ago and during this time the symptoms have increased.   In the ED, pt was diagnosed with   Final diagnoses:   Suicidal ideation   Major depressive disorder, recurrent episode, moderate (H)        Initial vitals were: BP: 111/68  Pulse: 88  Temp: 97.4  F (36.3  C)  Resp: 16  SpO2: 98 %   --------  Is the patient diabetic? No   If yes, last blood glucose? --     If yes, was this treated in the ED? --  --------  Is the patient inebriated (ETOH) No or Impaired on other substances? No  MSSA done? No  Last MSSA score: --    Were withdrawal symptoms treated? No  Does the patient have a seizure history? No. If yes, date of most recent seizure--  --------  Is the patient patient experiencing suicidal ideation? reports occasional suicidal thoughts representing feeling that life is not worth feeling    Homicidal ideation? denies current or recent homicidal ideation or behaviors.    Self-injurious behavior/urges? denies current or recent self injurious behavior or ideation.  ------  Was pt aggressive in the ED No  Was a code called No  Is the pt now cooperative? Yes  -------  Meds given in ED: Medications - No data to display   Family present during ED course? No  Family currently present? No    BACKGROUND  Does the patient have a cognitive impairment or developmental disability? No  Allergies:   Allergies   Allergen Reactions     Compazine [Prochlorperazine] Unknown     Haldol [Haloperidol] Unknown     anxiety   .   Social demographics are   Social History     Socioeconomic History     Marital status: Single     Spouse name: None     Number of children: None     Years of education: None      Highest education level: None   Occupational History     None   Social Needs     Financial resource strain: None     Food insecurity     Worry: None     Inability: None     Transportation needs     Medical: None     Non-medical: None   Tobacco Use     Smoking status: Current Every Day Smoker     Types: Cigarettes     Smokeless tobacco: Never Used   Substance and Sexual Activity     Alcohol use: Not Currently     Drug use: Yes     Types: Marijuana     Sexual activity: None   Lifestyle     Physical activity     Days per week: None     Minutes per session: None     Stress: None   Relationships     Social connections     Talks on phone: None     Gets together: None     Attends Hindu service: None     Active member of club or organization: None     Attends meetings of clubs or organizations: None     Relationship status: None     Intimate partner violence     Fear of current or ex partner: None     Emotionally abused: None     Physically abused: None     Forced sexual activity: None   Other Topics Concern     None   Social History Narrative     None        ASSESSMENT  Labs results Labs Ordered and Resulted from Time of ED Arrival Up to the Time of Departure from the ED - No data to display   Imaging Studies: No results found for this or any previous visit (from the past 24 hour(s)).   Most recent vital signs /68   Pulse 88   Temp 97.4  F (36.3  C) (Oral)   Resp 16   SpO2 98%    Abnormal labs/tests/findings requiring intervention:---   Pain control: good  Nausea control: good    RECOMMENDATION  Are any infection precautions needed (MRSA, VRE, etc.)? No If yes, what infection? --  ---  Does the patient have mobility issues? independently. If yes, what device does the pt use? ---  ---  Is patient on 72 hour hold or commitment? No If on 72 hour hold, have hold and rights been given to patient? No  Are admitting orders written if after 10 p.m. ?No  Tasks needing to be completed:---     Fabián Jamison RN    ascom-- [phone]   [phone] [facility]   [phone] [facility] ED

## 2020-04-22 NOTE — PROGRESS NOTES
Pt admitted to station 20 via ED on a voluntary status.  Pt interviewed with Dr Ennis. Pt reported her girlfriend heard her talk to her therapist and overreacted to what she was saying.  P{t wanting ECT.   Pt when done with interview wanted to shower. Pt showered.  Pt came out of shower and said loudly that she needed her inhaler.  I went down to room and pt calmly sitting on bed no signs of respertory distress.  Got pt nicotine patch and gum walked to her room.  Gave her patch and pt in strong voice then now I can't have nicotine gum.  I said I have nicotine gum and gave to her. Pt quickly calmed down.  Pt came to desk and wanting ativan.  Told pt awaiting pharmacist to verify it.  Pt in irritated voice wanted to talk to the pharmacist herself.  Pt currently laying in bed.

## 2020-04-22 NOTE — ED NOTES
Bed: ED16  Expected date: 4/22/20  Expected time:   Means of arrival:   Comments:  39 y.o. SI/Dizzy

## 2020-04-22 NOTE — ED NOTES
Sign out Provider: Sandra      Sign out Plan: Patient was seen on earlier shift by 1 of the providers, deemed in need of likely admission.  She is presenting with symptoms of major depression, suicidal thoughts, derogatory auditory hallucinations.  We were asked to obtain a formal mental health  evaluation prior to admission for further decision making with respect to disposition.      Reassessment: The patient was also seen by the [facility] , please refer to their extensive note/evaluation which was reviewed with me and is documented in EPIC on 4/22/2020 for further details.  Patient continues to endorse significant and potentially dangerous mental health symptoms.  She is unlikely to be successfully managed as an outpatient without potential serious complication.      Disposition: We will plan on voluntary mental health admission as per the plan at signout.       Jose Blanco MD  04/22/20 1016
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
## 2020-04-22 NOTE — TELEPHONE ENCOUNTER
S: 10: 20 AM DEC  at  ED seeking placement for a 38 YO  F  presenting with     B:  Pt  has a hx of  MDDw/ psychosis and agoraphobia who has been  off all of her meds for 2-3 months.  Past 2 weeks she has been decompensating  and  having increased SI w/ AH, not command, but  dirogatory in nature. Hx of successful ECT, would like ECT again.  No current OP MH services, does have pcp. No acute medical concerns    Reports substance use: marakatia  No history of assaultive or aggressive behavior  Labs: Utox  Pending    VSS    A: Voluntary         R: Patient cleared and ready for behavioral bed placement: Yes         R:  10:20 AM  Dr. Miller accepted patient on station 20, placed in queue    R:  10:40 AM  Disposition given to Station 20 charge nurse-can accept at noon today.  Called and updated patient's ED RN.  Will do nurse to nurse shortly before noon.

## 2020-04-23 VITALS
TEMPERATURE: 97.9 F | HEART RATE: 89 BPM | OXYGEN SATURATION: 98 % | DIASTOLIC BLOOD PRESSURE: 74 MMHG | RESPIRATION RATE: 16 BRPM | SYSTOLIC BLOOD PRESSURE: 114 MMHG

## 2020-04-23 LAB
ALBUMIN SERPL-MCNC: 3.1 G/DL (ref 3.4–5)
ALP SERPL-CCNC: 68 U/L (ref 40–150)
ALT SERPL W P-5'-P-CCNC: 26 U/L (ref 0–50)
ANION GAP SERPL CALCULATED.3IONS-SCNC: 3 MMOL/L (ref 3–14)
AST SERPL W P-5'-P-CCNC: 12 U/L (ref 0–45)
BILIRUB SERPL-MCNC: 0.4 MG/DL (ref 0.2–1.3)
BUN SERPL-MCNC: 13 MG/DL (ref 7–30)
CALCIUM SERPL-MCNC: 8.5 MG/DL (ref 8.5–10.1)
CHLORIDE SERPL-SCNC: 109 MMOL/L (ref 94–109)
CHOLEST SERPL-MCNC: 209 MG/DL
CO2 SERPL-SCNC: 28 MMOL/L (ref 20–32)
CREAT SERPL-MCNC: 0.62 MG/DL (ref 0.52–1.04)
DEPRECATED CALCIDIOL+CALCIFEROL SERPL-MC: 6 UG/L (ref 20–75)
ERYTHROCYTE [DISTWIDTH] IN BLOOD BY AUTOMATED COUNT: 13 % (ref 10–15)
GFR SERPL CREATININE-BSD FRML MDRD: >90 ML/MIN/{1.73_M2}
GLUCOSE SERPL-MCNC: 84 MG/DL (ref 70–99)
HBA1C MFR BLD: 4.9 % (ref 0–5.6)
HCT VFR BLD AUTO: 38.9 % (ref 35–47)
HDLC SERPL-MCNC: 52 MG/DL
HGB BLD-MCNC: 12.9 G/DL (ref 11.7–15.7)
LDLC SERPL CALC-MCNC: 134 MG/DL
MCH RBC QN AUTO: 31 PG (ref 26.5–33)
MCHC RBC AUTO-ENTMCNC: 33.2 G/DL (ref 31.5–36.5)
MCV RBC AUTO: 94 FL (ref 78–100)
NONHDLC SERPL-MCNC: 157 MG/DL
PLATELET # BLD AUTO: 192 10E9/L (ref 150–450)
POTASSIUM SERPL-SCNC: 3.8 MMOL/L (ref 3.4–5.3)
PROT SERPL-MCNC: 6.2 G/DL (ref 6.8–8.8)
RBC # BLD AUTO: 4.16 10E12/L (ref 3.8–5.2)
SODIUM SERPL-SCNC: 140 MMOL/L (ref 133–144)
TRIGL SERPL-MCNC: 115 MG/DL
TSH SERPL DL<=0.005 MIU/L-ACNC: 1.14 MU/L (ref 0.4–4)
VIT B12 SERPL-MCNC: 281 PG/ML (ref 193–986)
WBC # BLD AUTO: 5.6 10E9/L (ref 4–11)

## 2020-04-23 PROCEDURE — 85027 COMPLETE CBC AUTOMATED: CPT | Performed by: STUDENT IN AN ORGANIZED HEALTH CARE EDUCATION/TRAINING PROGRAM

## 2020-04-23 PROCEDURE — 25000132 ZZH RX MED GY IP 250 OP 250 PS 637: Mod: GY | Performed by: STUDENT IN AN ORGANIZED HEALTH CARE EDUCATION/TRAINING PROGRAM

## 2020-04-23 PROCEDURE — 80061 LIPID PANEL: CPT | Performed by: STUDENT IN AN ORGANIZED HEALTH CARE EDUCATION/TRAINING PROGRAM

## 2020-04-23 PROCEDURE — 82306 VITAMIN D 25 HYDROXY: CPT | Performed by: STUDENT IN AN ORGANIZED HEALTH CARE EDUCATION/TRAINING PROGRAM

## 2020-04-23 PROCEDURE — 83036 HEMOGLOBIN GLYCOSYLATED A1C: CPT | Performed by: STUDENT IN AN ORGANIZED HEALTH CARE EDUCATION/TRAINING PROGRAM

## 2020-04-23 PROCEDURE — 36415 COLL VENOUS BLD VENIPUNCTURE: CPT | Performed by: STUDENT IN AN ORGANIZED HEALTH CARE EDUCATION/TRAINING PROGRAM

## 2020-04-23 PROCEDURE — 80053 COMPREHEN METABOLIC PANEL: CPT | Performed by: STUDENT IN AN ORGANIZED HEALTH CARE EDUCATION/TRAINING PROGRAM

## 2020-04-23 PROCEDURE — 82607 VITAMIN B-12: CPT | Performed by: STUDENT IN AN ORGANIZED HEALTH CARE EDUCATION/TRAINING PROGRAM

## 2020-04-23 PROCEDURE — 84443 ASSAY THYROID STIM HORMONE: CPT | Performed by: STUDENT IN AN ORGANIZED HEALTH CARE EDUCATION/TRAINING PROGRAM

## 2020-04-23 PROCEDURE — 99238 HOSP IP/OBS DSCHRG MGMT 30/<: CPT | Mod: GC | Performed by: PSYCHIATRY & NEUROLOGY

## 2020-04-23 RX ORDER — BUPROPION HYDROCHLORIDE 300 MG/1
300 TABLET ORAL DAILY
Status: DISCONTINUED | OUTPATIENT
Start: 2020-04-23 | End: 2020-04-23 | Stop reason: HOSPADM

## 2020-04-23 RX ORDER — ALBUTEROL SULFATE 90 UG/1
2 AEROSOL, METERED RESPIRATORY (INHALATION) EVERY 6 HOURS
Qty: 1 INHALER | Refills: 3 | Status: SHIPPED | OUTPATIENT
Start: 2020-04-23

## 2020-04-23 RX ORDER — EPINEPHRINE 0.3 MG/.3ML
0.3 INJECTION SUBCUTANEOUS PRN
Qty: 1 EACH | Refills: 3 | Status: SHIPPED | OUTPATIENT
Start: 2020-04-23

## 2020-04-23 RX ORDER — BUPROPION HYDROCHLORIDE 150 MG/1
150 TABLET ORAL DAILY
Status: DISCONTINUED | OUTPATIENT
Start: 2020-04-23 | End: 2020-04-23

## 2020-04-23 RX ORDER — BUPROPION HYDROCHLORIDE 300 MG/1
300 TABLET ORAL DAILY
Qty: 30 TABLET | Refills: 1 | Status: SHIPPED | OUTPATIENT
Start: 2020-04-24

## 2020-04-23 RX ADMIN — FLUTICASONE PROPIONATE AND SALMETEROL XINAFOATE 2 PUFF: 45; 21 AEROSOL, METERED RESPIRATORY (INHALATION) at 08:47

## 2020-04-23 RX ADMIN — NICOTINE 1 PATCH: 14 PATCH, EXTENDED RELEASE TRANSDERMAL at 08:49

## 2020-04-23 RX ADMIN — NICOTINE POLACRILEX 2 MG: 2 GUM, CHEWING BUCCAL at 08:47

## 2020-04-23 RX ADMIN — BUPROPION HYDROCHLORIDE 300 MG: 300 TABLET, EXTENDED RELEASE ORAL at 11:08

## 2020-04-23 RX ADMIN — Medication 1 CAPSULE: at 08:47

## 2020-04-23 NOTE — PLAN OF CARE
BEHAVIORAL TEAM DISCUSSION    Participants: Dr. Miller, Dr. Draper (Resident), Dr. Martin (Resident), La Nena Wetzel (CTC), Anali Aldana ([facility])    Progress: Continuing to assess.     Anticipated length of stay: 3-5 days.     Continued Stay Criteria/Rationale: Assessment, evaluation, and recommendations.     Medical/Physical: Asthma.   Precautions:   Behavioral Orders   Procedures     Code 1 - Restrict to Unit     Elopement precautions     Routine Programming     As clinically indicated     Self Injury Precaution     Status 15     Every 15 minutes.     Suicide precautions     Patients on Suicide Precautions should have a Combination Diet ordered that includes a Diet selection(s) AND a Behavioral Tray selection for Safe Tray - with utensils, or Safe Tray - NO utensils       Plan: The plan is to assess the patient for mental health and medication needs. The patient will be prescribed medications to treat the identified symptoms. Patient will participate in therapeutic skill building groups on the unit. [facility] to coordinate discharge/after care planning.     Rationale for change in precautions or plan: None.
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
## 2020-04-23 NOTE — DISCHARGE INSTRUCTIONS
Behavioral Discharge Planning and Instructions    Summary:  You were admitted on 4/22/2020  due to Suicidal Ideation and Aduitory Hallucinations. .  You were treated by Dr. Raimundo Miller MD and discharged on 04/23/2020 from [facility] to [place] [address] [place] [address].     Principal Diagnosis:   Major Depressive Disorder with psychotic features  Borderline Personality Disorder    Health Care Follow-up Appointments:   Primary Care   Monday, April 27th at 1:00pm with Davida Maged Zarco  [address] [address]  Phone: [phone]  ***Please note that this is an in-person appointment with your provider. If you would like to schedule a telemedicine appointment with your provider by calling the number above.***    Attend all scheduled appointments with your outpatient providers. Call at least 24 hours in advance if you need to reschedule an appointment to ensure continued access to your outpatient providers.   Major Treatments, Procedures and Findings:  You were provided with: a psychiatric assessment, medication evaluation and/or management, group therapy and milieu management    Symptoms to Report: feeling more aggressive, increased confusion, losing more sleep, mood getting worse or thoughts of suicide    Early warning signs can include: increased depression or anxiety sleep disturbances increased thoughts or behaviors of suicide or self-harm  increased unusual thinking, such as paranoia or hearing voices    Safety and Wellness:  Take all medicines as directed. Make no changes unless your doctor suggests them.  Follow treatment recommendations. Refrain from alcohol and non-prescribed drugs.  Ask your support system to help you reduce your access to items that could harm yourself or others. Items could include:   - Firearms  - Medicines (both prescribed and over-the-counter)  - Knives and other sharp objects  - Ropes and like materials  - Car keys  If there is a  "concern for safety, call 911. If there is a concern for safety, call 911.    Resources:   [facility] Crisis Response 885-338-0902  Crisis Intervention: 417.663.1648 or 053-305-8422 (TTY: 387.501.5301).  Call anytime for help.  National [place] on Mental Illness (www.mn.barb.org): 464.727.1253 or 403-267-3383.  Suicide Awareness Voices of Education (SAVE) (www.save.org): 705-885-CXHN (4324)  National Suicide Prevention Line (www.mentalhealthmn.org): 422-228-IKQK (3730)  Mental Health Consumer/Survivor Network of [place] (www.mhcsn.net): 716.407.1374 or 556-763-4053  Mental Health Association of [place] (www.mentalhealth.org): 454.885.5864 or 357-256-8002  Self- Management and Recovery Training., [employer]-- Toll free: 559.990.3929 www.citibuddies.[employer]  Text 4 Life: txt \"LIFE\" to 67249 for immediate support and crisis intervention  Crisis text line: Text \"[place]\" to 500828. Free, confidential, 24/7.  Crisis Intervention: 514.417.8673 or 756-018-5914. Call anytime for help.     The treatment team has appreciated the opportunity to work with you.     If you have any questions or concerns our unit number is [phone].   "

## 2020-04-23 NOTE — PROGRESS NOTES
Pt discharged as per order.  Pt denied SI.  Reviewed discharge paperwork with pt.  Pt said she had no SI thoughts.  Pt discharged with all belongings and discharge paperwork.  Pts friend came and picked pt up.

## 2020-04-23 NOTE — PROGRESS NOTES
Pt was visible in the milieu this evening. Pt denied any SI/SIB or hallucinations. Pt denied any thoughts of self harm. Pt was approachable and pleasant with staff and peers. Pt didn't shower this evening but washed some clothes. Pt appeared to have a good evening.        04/22/20 2300   Behavioral Health   Hallucinations denies / not responding to hallucinations   Thinking poor concentration   Orientation person: oriented;place: oriented;date: oriented;time: oriented   Memory baseline memory   Insight insight appropriate to situation   Judgement impaired   Eye Contact at examiner   Affect irritable   Mood mood is calm   Physical Appearance/Attire appears stated age   Hygiene well groomed   Suicidality   (denies)   1. Wish to be Dead (Recent) No   2. Non-Specific Active Suicidal Thoughts (Recent) No   Speech clear;coherent   Psychomotor / Gait balanced;steady   Psycho Education   Type of Intervention 1:1 intervention   Response participates, initiates socially appropriate   Hours 0.5   Activities of Daily Living   Hygiene/Grooming independent   Oral Hygiene independent   Dress scrubs (behavioral health);independent   Laundry with supervision   Room Organization independent

## 2020-05-15 ENCOUNTER — TELEPHONE (OUTPATIENT)
Dept: PHARMACY | Facility: CLINIC | Age: 40
End: 2020-05-15

## 2020-05-15 NOTE — TELEPHONE ENCOUNTER
Med Rec:                                                    Chart audited for medication reconciliation regarding the following medications:   atorvastatin (LIPITOR) 40 MG tablet [42113]  lisinopril-hydrochlorothiazide (ZESTORETIC) 20-25 MG tablet [[id number]]  metFORMIN (GLUCOPHAGE) 500 MG tablet [[id number]]  BLISOVI FE 1/20 1-20 MG-MCG tablet [[id number]]    Pharmacy contacted: No  Patient contacted: No  Discrepancies found: Yes: previous inpatient med rec discovered False Surescripts info - 4/22/20  Action taken: none    Discrepancies discovered by hospital pharmacist on 4/22/20.    Time spent: 5 minutes    Antonio Marlow, PharmD, BCACP  Medication Therapy Management Pharmacist  Pager: [phone]

## 2021-03-03 ENCOUNTER — TELEPHONE (OUTPATIENT)
Dept: PSYCHIATRY | Facility: CLINIC | Age: 41
End: 2021-03-03

## 2021-03-03 NOTE — TELEPHONE ENCOUNTER
Writer returned call to Dr. Garcia.  She is referring patient over for ECT, patient has also requested ECT.  Let her know that Dr. Cerrato is doing the ECT evals, so writer will send message over to his team.      Dr. Garcia thanked writer and stated that Dr. Cerrato could call her should he have any questions.

## 2021-03-03 NOTE — TELEPHONE ENCOUNTER
Dr. Garcia, psychiatrist, calling to have pt get ect, pt has gone through [facility] before, when Dr called ect dept they said she had to go through either Dr. Hackett or Dr. Ngo.

## 2021-03-09 ENCOUNTER — TELEPHONE (OUTPATIENT)
Dept: PSYCHIATRY | Facility: CLINIC | Age: 41
End: 2021-03-09

## 2021-03-10 ENCOUNTER — VIRTUAL VISIT (OUTPATIENT)
Dept: PSYCHIATRY | Facility: CLINIC | Age: 41
End: 2021-03-10
Attending: PSYCHIATRY & NEUROLOGY
Payer: MEDICARE

## 2021-03-10 DIAGNOSIS — F25.9 SCHIZOAFFECTIVE DISORDER, UNSPECIFIED TYPE (H): Primary | ICD-10-CM

## 2021-03-10 DIAGNOSIS — F43.10 PTSD (POST-TRAUMATIC STRESS DISORDER): ICD-10-CM

## 2021-03-10 PROCEDURE — 99215 OFFICE O/P EST HI 40 MIN: CPT | Mod: GC | Performed by: STUDENT IN AN ORGANIZED HEALTH CARE EDUCATION/TRAINING PROGRAM

## 2021-03-10 ASSESSMENT — PAIN SCALES - GENERAL: PAINLEVEL: MODERATE PAIN (4)

## 2021-03-10 NOTE — TELEPHONE ENCOUNTER
TELEPHONE NOTE    Today I had a cancellation for an ECT evaluation slot tomorrow, so contacted the scheduling desk and learned that they had been trying to contact the patient and were unable to reach her on the phone numbers given in epic.  I contacted Dr. Tasha Garcia, who referred the patient for ECT, at her phone [phone] to discuss the case and get better contact information for the patient.  Dr. Garcia related that she has followed La Nena for many years at the [address] [place] [address] in [place], and over the past 2 years at her new position at [employer] in [place].  She stated that La Nena has struggled with a severe PTSD all her life, has not benefited much at all from a wide range of different medications which she will email me a list, but has responded to ECT as recently as 2018.  Because of her poor response to medication, she did not see a psychiatrist for some time prior to her April 2020 admission to [facility], and apparently did not follow-up with any mental health care until she returned to see Dr. Garcia in December 2020.  Dr. Garcia indicates she put La Nena on bupropion but is on no other medications.  She believes La Nena may have an element of ADHD, does not think she is currently abusing illicit substances, and verified that she had not only a course of ECT but ongoing mECT in 2018 as well.    Dr. Garcia gave me a current phone number for the patient [phone], so I called her and confirmed that she would be able to do a video visit tomorrow, 3/10/2021 at 1:30 PM for about 90 minutes for an ECT evaluation.  I told her that the desk staff from the [facility] would call her in the morning to verify insurance information and do a prescreening.  She indicated that she would like to do outpatient and have medical transport bring her to and from the hospital, with an ILS worker who visits her Monday Wednesday and Friday available to stay with her until 4 PM after ECT.  I asked that that ILS  worker be available tomorrow during her appointment to discuss her care.    Jj Cerrato MD  [facility] Psychiatry  ECT Service
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
## 2021-03-10 NOTE — PATIENT INSTRUCTIONS
**For crisis resources, please see the information at the end of this document**     Patient Education      Thank you for coming to the [facility] [place] [facility].    Lab Testing:  If you had lab testing today and your results are reassuring or normal they will be mailed to you or sent through [employer] within 7 days. If the lab tests need quick action we will call you with the results. The phone number we will call with results is # [phone] (home) . If this is not the best number please call our clinic and change the number.    Medication Refills:  If you need any refills please call your pharmacy and they will contact us. Our fax number for refills is [phone]. Please allow three business for refill processing. If you need to  your refill at a new pharmacy, please contact the new pharmacy directly. The new pharmacy will help you get your medications transferred.     Scheduling:  If you have any concerns about today's visit or wish to schedule another appointment please call our office during normal business hours [phone] (8-5:00 M-F)    Contact Us:  Please call [phone] during business hours (8-5:00 M-F).  If after clinic hours, or on the weekend, please call  [phone].    Financial Assistance [phone]  [facility] Billing [phone]  Central Billing Office, [facility]: [phone]  [facility] Billing [phone]  Medical Records [phone]  [facility] Patient Bill of Rights [email or web address]       MENTAL HEALTH CRISIS NUMBERS:  For a medical emergency please call  911 or go to the nearest ER.     [facility]:   [facility] -[phone]   Crisis Residence [facility] Residence -[phone]   Walk-In Counseling Center [facility] -[phone]   COPE 24/7 [facility] Mobile Team -[phone] (adults)/[phone] (child)  CHILD: [facility] needs assessment  team - [phone]      [facility]:   [facility] - [phone]   Walk-in counseling [facility] - [phone]   Walk-in counseling [facility] - [phone]   Crisis Residence [facility] Residence - [phone]  Urgent Care Adult Mental [facility]-[phone] mobile unit/ 24/7 crisis line    National Crisis Numbers:   National Suicide Prevention Lifeline: 5-245-141-TALK (394-208-4329)  Poison Control Center - 7-364-765-1196  [employer]/resources for a list of additional resources (SOS)  Trans Lifeline a hotline for transgender people 1-811.873.5279  The [employer] a hotline for LGBT youth 2-720-900-8589  Crisis Text Line: For any crisis 24/7   To: 566977  see www.crisistextline.org  - IF MAKING A CALL FEELS TOO HARD, send a text!         Again thank you for choosing [facility] MENTAL HE[facility] & ADDICTION [facility] and please let us know how we can best partner with you to improve you and your family's health.    You may be receiving a survey regarding this appointment. We would love to have your feedback, both positive and negative. The survey is done by an external company, so your answers are anonymous.

## 2021-03-11 ENCOUNTER — TELEPHONE (OUTPATIENT)
Dept: PSYCHIATRY | Facility: CLINIC | Age: 41
End: 2021-03-11
Payer: MEDICARE

## 2021-03-11 NOTE — TELEPHONE ENCOUNTER
RE: ECT PA  Received: Today  Message Contents   Keyanna Pantoja, Otto Villafuerte, DO; Farzaneh Castro RN; Tip Cerrato MD             You're welcome!     Benefits are back and no pre authorization is required for either patient for outpatient ECT. These patients are okay from an insurance standpoint to start treatment at any time.     Thanks,   Keyanna

## 2021-03-14 ENCOUNTER — HEALTH MAINTENANCE LETTER (OUTPATIENT)
Age: 41
End: 2021-03-14

## 2021-03-22 ENCOUNTER — TELEPHONE (OUTPATIENT)
Dept: PSYCHIATRY | Facility: CLINIC | Age: 41
End: 2021-03-22

## 2021-03-22 DIAGNOSIS — F25.9 SCHIZOAFFECTIVE DISORDER, UNSPECIFIED TYPE (H): Primary | ICD-10-CM

## 2021-03-22 DIAGNOSIS — F43.10 PTSD (POST-TRAUMATIC STRESS DISORDER): ICD-10-CM

## 2021-03-22 NOTE — TELEPHONE ENCOUNTER
"TELEPHONE CALL    I called the patient today to follow-up on her report 10 days ago that she had used meth.  La Nena told me that she has a preop exam scheduled tomorrow at an [facility] clinic, and has tentatively made arrangements for [facility] medical transportation to bring her to ECT and home again, with 2 or 3 friends available to watch her later who have committed to meeting her upon her arrival back at home after treatment.  She reports that her \"emotions got away from me\" the weekend after her ECT evaluation and she obtained some meth and used it for a couple of days, but has not used it previously for quite some time, and denies any other drug use other than cannabis as discussed at her evaluation.  I again reviewed the potential serious complications of amphetamine use concurrently with ECT and the patient verbalized her understanding and willingness to enter the hospital if she experiences urges to use again, and understands that we may require a urine toxicology test to demonstrate abstinence prior to any ECT.  We discussed it seems likely that her first ECT could be done on 3/29/2021, assuming that we received the EKG tracing via fax and the COVID test could be done late this week.      "

## 2021-03-31 ENCOUNTER — HOSPITAL ENCOUNTER (INPATIENT)
Facility: CLINIC | Age: 41
LOS: 2 days | Discharge: HOME | DRG: 885 | End: 2021-04-02
Attending: EMERGENCY MEDICINE | Admitting: PSYCHIATRY & NEUROLOGY
Payer: MEDICARE

## 2021-03-31 ENCOUNTER — TELEPHONE (OUTPATIENT)
Dept: BEHAVIORAL HEALTH | Facility: CLINIC | Age: 41
End: 2021-03-31

## 2021-03-31 DIAGNOSIS — F33.3 SEVERE EPISODE OF RECURRENT MAJOR DEPRESSIVE DISORDER, WITH PSYCHOTIC FEATURES (H): ICD-10-CM

## 2021-03-31 DIAGNOSIS — Z11.8 SPECIAL SCREENING EXAMINATION FOR CHLAMYDIAL DISEASE: ICD-10-CM

## 2021-03-31 DIAGNOSIS — Z20.822 CONTACT WITH AND (SUSPECTED) EXPOSURE TO COVID-19: ICD-10-CM

## 2021-03-31 DIAGNOSIS — F15.10 METHAMPHETAMINE ABUSE (H): ICD-10-CM

## 2021-03-31 DIAGNOSIS — F43.10 POSTTRAUMATIC STRESS DISORDER: Primary | ICD-10-CM

## 2021-03-31 LAB
LABORATORY COMMENT REPORT: NORMAL
SARS-COV-2 RNA RESP QL NAA+PROBE: NEGATIVE
SPECIMEN SOURCE: NORMAL

## 2021-03-31 PROCEDURE — 250N000013 HC RX MED GY IP 250 OP 250 PS 637: Performed by: STUDENT IN AN ORGANIZED HEALTH CARE EDUCATION/TRAINING PROGRAM

## 2021-03-31 PROCEDURE — 250N000013 HC RX MED GY IP 250 OP 250 PS 637: Performed by: EMERGENCY MEDICINE

## 2021-03-31 PROCEDURE — 93005 ELECTROCARDIOGRAM TRACING: CPT

## 2021-03-31 PROCEDURE — 99285 EMERGENCY DEPT VISIT HI MDM: CPT | Performed by: EMERGENCY MEDICINE

## 2021-03-31 PROCEDURE — 90791 PSYCH DIAGNOSTIC EVALUATION: CPT

## 2021-03-31 PROCEDURE — 99285 EMERGENCY DEPT VISIT HI MDM: CPT | Mod: 25 | Performed by: EMERGENCY MEDICINE

## 2021-03-31 PROCEDURE — 124N000002 HC R&B MH UMMC

## 2021-03-31 PROCEDURE — 87635 SARS-COV-2 COVID-19 AMP PRB: CPT | Performed by: EMERGENCY MEDICINE

## 2021-03-31 PROCEDURE — 93010 ELECTROCARDIOGRAM REPORT: CPT | Performed by: INTERNAL MEDICINE

## 2021-03-31 PROCEDURE — C9803 HOPD COVID-19 SPEC COLLECT: HCPCS | Performed by: EMERGENCY MEDICINE

## 2021-03-31 RX ORDER — HYDROXYZINE HYDROCHLORIDE 25 MG/1
25 TABLET, FILM COATED ORAL 2 TIMES DAILY PRN
Status: DISCONTINUED | OUTPATIENT
Start: 2021-03-31 | End: 2021-03-31

## 2021-03-31 RX ORDER — OMEPRAZOLE 40 MG/1
40 CAPSULE, DELAYED RELEASE ORAL EVERY MORNING
COMMUNITY

## 2021-03-31 RX ORDER — ACETAMINOPHEN 325 MG/1
650 TABLET ORAL EVERY 4 HOURS PRN
Status: DISCONTINUED | OUTPATIENT
Start: 2021-03-31 | End: 2021-04-02 | Stop reason: HOSPADM

## 2021-03-31 RX ORDER — DIAZEPAM 5 MG
TABLET ORAL
COMMUNITY
Start: 2021-01-05 | End: 2021-03-31

## 2021-03-31 RX ORDER — PRAZOSIN HYDROCHLORIDE 1 MG/1
1 CAPSULE ORAL AT BEDTIME
Status: DISCONTINUED | OUTPATIENT
Start: 2021-03-31 | End: 2021-04-02 | Stop reason: HOSPADM

## 2021-03-31 RX ORDER — HYDROXYZINE HYDROCHLORIDE 25 MG/1
25 TABLET, FILM COATED ORAL EVERY 4 HOURS PRN
Status: DISCONTINUED | OUTPATIENT
Start: 2021-03-31 | End: 2021-04-02 | Stop reason: HOSPADM

## 2021-03-31 RX ORDER — MAGNESIUM HYDROXIDE/ALUMINUM HYDROXICE/SIMETHICONE 120; 1200; 1200 MG/30ML; MG/30ML; MG/30ML
30 SUSPENSION ORAL EVERY 4 HOURS PRN
Status: DISCONTINUED | OUTPATIENT
Start: 2021-03-31 | End: 2021-04-02 | Stop reason: HOSPADM

## 2021-03-31 RX ORDER — OLANZAPINE 10 MG/2ML
10 INJECTION, POWDER, FOR SOLUTION INTRAMUSCULAR 3 TIMES DAILY PRN
Status: DISCONTINUED | OUTPATIENT
Start: 2021-03-31 | End: 2021-04-02 | Stop reason: HOSPADM

## 2021-03-31 RX ORDER — HYDROXYZINE PAMOATE 25 MG/1
25 CAPSULE ORAL 2 TIMES DAILY PRN
COMMUNITY

## 2021-03-31 RX ORDER — ALBUTEROL SULFATE 90 UG/1
2 AEROSOL, METERED RESPIRATORY (INHALATION) EVERY 6 HOURS
Status: DISCONTINUED | OUTPATIENT
Start: 2021-03-31 | End: 2021-04-02 | Stop reason: HOSPADM

## 2021-03-31 RX ORDER — EPINEPHRINE 0.3 MG/.3ML
0.3 INJECTION SUBCUTANEOUS
Status: DISCONTINUED | OUTPATIENT
Start: 2021-03-31 | End: 2021-04-02 | Stop reason: HOSPADM

## 2021-03-31 RX ORDER — POLYETHYLENE GLYCOL 3350 17 G/17G
17 POWDER, FOR SOLUTION ORAL DAILY PRN
Status: DISCONTINUED | OUTPATIENT
Start: 2021-03-31 | End: 2021-04-02 | Stop reason: HOSPADM

## 2021-03-31 RX ORDER — BUPROPION HYDROCHLORIDE 300 MG/1
300 TABLET ORAL DAILY
Status: DISCONTINUED | OUTPATIENT
Start: 2021-04-01 | End: 2021-04-02 | Stop reason: HOSPADM

## 2021-03-31 RX ORDER — NICOTINE 21 MG/24HR
1 PATCH, TRANSDERMAL 24 HOURS TRANSDERMAL ONCE
Status: COMPLETED | OUTPATIENT
Start: 2021-03-31 | End: 2021-04-01

## 2021-03-31 RX ORDER — TRAZODONE HYDROCHLORIDE 50 MG/1
50 TABLET, FILM COATED ORAL
Status: DISCONTINUED | OUTPATIENT
Start: 2021-03-31 | End: 2021-04-02 | Stop reason: HOSPADM

## 2021-03-31 RX ORDER — NICOTINE 21 MG/24HR
1 PATCH, TRANSDERMAL 24 HOURS TRANSDERMAL DAILY
Status: DISCONTINUED | OUTPATIENT
Start: 2021-04-01 | End: 2021-04-02 | Stop reason: HOSPADM

## 2021-03-31 RX ORDER — PANTOPRAZOLE SODIUM 40 MG/1
40 TABLET, DELAYED RELEASE ORAL
Status: DISCONTINUED | OUTPATIENT
Start: 2021-04-01 | End: 2021-04-02 | Stop reason: HOSPADM

## 2021-03-31 RX ORDER — LORAZEPAM 1 MG/1
1 TABLET ORAL ONCE
Status: COMPLETED | OUTPATIENT
Start: 2021-03-31 | End: 2021-03-31

## 2021-03-31 RX ORDER — OLANZAPINE 10 MG/1
10 TABLET ORAL 3 TIMES DAILY PRN
Status: DISCONTINUED | OUTPATIENT
Start: 2021-03-31 | End: 2021-04-02 | Stop reason: HOSPADM

## 2021-03-31 RX ADMIN — HYDROXYZINE HYDROCHLORIDE 25 MG: 25 TABLET, FILM COATED ORAL at 22:09

## 2021-03-31 RX ADMIN — ALBUTEROL SULFATE 2 PUFF: 90 AEROSOL, METERED RESPIRATORY (INHALATION) at 22:17

## 2021-03-31 RX ADMIN — NICOTINE 1 PATCH: 21 PATCH, EXTENDED RELEASE TRANSDERMAL at 17:07

## 2021-03-31 RX ADMIN — PRAZOSIN HYDROCHLORIDE 1 MG: 1 CAPSULE ORAL at 22:09

## 2021-03-31 RX ADMIN — LORAZEPAM 1 MG: 1 TABLET ORAL at 14:04

## 2021-03-31 ASSESSMENT — ENCOUNTER SYMPTOMS
SLEEP DISTURBANCE: 1
APPETITE CHANGE: 1
ABDOMINAL PAIN: 0
SHORTNESS OF BREATH: 0
HALLUCINATIONS: 1

## 2021-03-31 ASSESSMENT — MIFFLIN-ST. JEOR: SCORE: 1759.95

## 2021-03-31 NOTE — SAFE
"La Nena BREANNA Aman  March 31, 2021    Pt with historical diagnoses of MDD with psychotic features vs Schizoaffective Disorder, PTSD, Borderline Features and Stimulant Abuse comes to the hospital seeking admission in order to start ECT.  The pt is a patient of Dr Joshi.  The pt denies suicidal ideation, plan and intent.  States that she cannot commit to safety outside of the hospital.  The pt states she has been using meth for the past two months with her last use yesterday.  Reports auditory and visual hallucinations.  Pt states she is non-compliant with her prescribed medications.  States she has not been sleeping, not eating and not able to manage her ADLs.          Current Suicidal Ideation/Self-Injurious Concerns/Methods: None - N/A  Pt denies suicidal ideation, plan and intent.  The pt states she cannot commit to safety outside of the hospital.  States she is \"scared.\"  Pt has a hx of suicide attempt at age 20 by overdose.    Inappropriate Sexual Behavior: No    Aggression/Homicidal Ideation: None - N/A There is no recorded hx of aggression.        For additional details see full DEC assessment.       India Davis, LICSW      "

## 2021-03-31 NOTE — TELEPHONE ENCOUNTER
S: 9:20AMRimma Moy from the Psychiatry Clinic called to give collateral info on a 40 yrs old female en route to the ER.     B: Pt is a patient of Dr. Cerrato.  She has recently started using Meth again in the last 3-4 days.  She has increased self-harm thoughts when alone (when roommates are not home).  Pt is unable to stop using meth, not sleeping, has difficulty eating.  Very tearful and isolating.  Pt has a good support of friends and they will bring her in.  Pt has been having issues for a week now.  They are considering her for ECT. Dr. Cerrato suggests admit to station 20 or 22 to initiate ECT.     A: Pt is on route to the ER for an assessment.     R: To be assessed for mh inpt in the ER.

## 2021-03-31 NOTE — ED NOTES
"When nurse attempted to do covid swab, pt became agitated and vaguely threatening \"If you don't get it out of my nose quick, I swear to god...\", and then \"I refuse, this is too invasive\". Nurse feels that the pt has the potential to swing at her when doing the test, as pt was gesticulating quite a bit with her hands when she was upset.   "
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
## 2021-03-31 NOTE — ED PROVIDER NOTES
"ED Provider Note  [facility]      History     Chief Complaint   Patient presents with     Depression     depression and anxiety, occasional SI, denies plan     The history is provided by the patient and medical records.     La Nena Newton is a 40 year old female with a past medical history significant for major depressive disorder with psychotic feature versus schizoaffective with depressive type, PTSD, agoraphobia and substance abuse (meth) who presents to the ED for evaluation of depression and possible admission for ect. .  She presents with worsening depression, not sleeping and difficulty eating.  She is isolating and tearful.  She has \"increased self-harm thoughts when alone.\"  The patient has been taking Wellbutrin.   She has also been trying to start ECT since early March.  Today, the patient called Dr. Cerrato's office for feeling alone and endorsing auditory hallucinations that are derogatory towards her.  She has also seen a headless man in a chair next to her. She relapsed on meth 3-4 days ago.  The patient last used meth yesterday.   She states that she does not have a plan to commit suicide but cannot commit to safety.  She has had a previous suicide attempt about 20 years ago.    Past Medical History  Past Medical History:   Diagnosis Date     Agoraphobia      Borderline personality disorder (H)      Depressive disorder      PTSD (post-traumatic stress disorder)      History reviewed. No pertinent surgical history.  albuterol (PROAIR HFA/PROVENTIL HFA/VENTOLIN HFA) 108 (90 Base) MCG/ACT inhaler  buPROPion (WELLBUTRIN XL) 300 MG 24 hr tablet  diazepam (VALIUM) 5 MG tablet  lactobacillus rhamnosus, GG, (CULTURELL) capsule  OMEPRAZOLE PO  Cyanocobalamin 2500 MCG TABS  EPINEPHrine (ANY BX GENERIC EQUIV) 0.3 MG/0.3ML injection 2-pack  melatonin 5 MG tablet      Allergies   Allergen Reactions     Compazine [Prochlorperazine] Unknown     Artificial Sweetner (Informational Only) " "[Artificial Sweetner (Informational Only) ]      Bees      Haldol [Haloperidol] Unknown     anxiety     Family History  History reviewed. No pertinent family history.  Social History   Social History     Tobacco Use     Smoking status: Current Every Day Smoker     Packs/day: 0.50     Types: Cigarettes     Smokeless tobacco: Never Used   Substance Use Topics     Alcohol use: Not Currently     Drug use: Yes     Types: Marijuana      Past medical history, past surgical history, medications, allergies, family history, and social history were reviewed with the patient. No additional pertinent items.       Review of Systems   Constitutional: Positive for appetite change (Decreased).   Respiratory: Negative for shortness of breath.    Cardiovascular: Negative for chest pain.   Gastrointestinal: Negative for abdominal pain.   Genitourinary:        States some \"urinary/vaginal\" issues and does not want to explain further.    Psychiatric/Behavioral: Positive for hallucinations and sleep disturbance. Negative for suicidal ideas.   All other systems reviewed and are negative.    A complete review of systems was performed with pertinent positives and negatives noted in the HPI, and all other systems negative.    Physical Exam   BP: (!) 147/86  Pulse: 97  Temp: 97.6  F (36.4  C)  Resp: 18  Height: 167.6 cm (5' 6\")  Weight: 107.8 kg (237 lb 11.2 oz)  SpO2: 100 %  Physical Exam  Vitals signs and nursing note reviewed.   Constitutional:       Appearance: She is obese.   HENT:      Head: Normocephalic and atraumatic.      Nose: Nose normal.   Eyes:      Extraocular Movements: Extraocular movements intact.   Neck:      Musculoskeletal: Normal range of motion.   Cardiovascular:      Rate and Rhythm: Normal rate.   Pulmonary:      Effort: Pulmonary effort is normal.   Musculoskeletal: Normal range of motion.   Skin:     General: Skin is warm and dry.   Neurological:      General: No focal deficit present.      Mental Status: She is " alert and oriented to person, place, and time.   Psychiatric:         Attention and Perception: Attention and perception normal.         Mood and Affect: Mood is anxious and depressed. Affect is flat.         Speech: Speech normal.         Behavior: Behavior is slowed. Behavior is cooperative.         Thought Content: Thought content includes suicidal ideation.         Cognition and Memory: Cognition and memory normal.         Judgment: Judgment normal.         ED Course       Procedures        The medical record was reviewed and interpreted.  Current labs reviewed and interpreted.       No results found for any visits on 03/31/21.  Medications   LORazepam (ATIVAN) tablet 1 mg (1 mg Oral Given 3/31/21 1404)        Assessments & Plan (with Medical Decision Making)   The patient has hx of MDD with psychosis vs schizoaffective disorder, depressive type who presents to the ED with worsening depression, relapse on meth, medication noncompliance and unable to contract for safety.  Her provider has been trying to get her into ECT.  She was seen by myself and the DEC  and we also agree inpatient mental health is appropriate for this patient.  This is a voluntary admission.  covid testing and utox were ordered.  She was given ativan 1 mg po due to anxiety and being too afraid to get a covid swab. UPT not checked due to s/p hysterectomy.  Also she complains of some urination/vaginal concerns.  She does not want a pelvic exam done.  UA, uriprobe GC/CHD ordered.     I have reviewed the nursing notes. I have reviewed the findings, diagnosis, plan and need for follow up with the patient.    New Prescriptions    No medications on file       Final diagnoses:   Severe episode of recurrent major depressive disorder, with psychotic features (H)   Methamphetamine abuse (H)       --  I, Andrea Graves, am serving as a trained medical scribe to document services personally performed by Minerva Heart MD, based on the provider's  statements to me.     I, Minerva Heart MD, was physically present and have reviewed and verified the accuracy of this note documented by Andrea Graves.    Minerva Heart MD  [facility] EMERGENCY DEPARTMENT  3/31/2021     Minerva Heart MD  03/31/21 1500

## 2021-03-31 NOTE — PHARMACY-ADMISSION MEDICATION HISTORY
Admission Medication History Completed by Pharmacy    See [facility] Admission Navigator for allergy information, preferred outpatient pharmacy, prior to admission medications and immunization status.     Medication History Sources:     [facility] (fill history), [facility], and patient interview (via iPad)  o [facility] [place] (supplemental fill history)    Changes made to PTA medication list (reason):    Added:   o Hydroxyzine (recent rx from 3/1/21)    Flagged for MD review:  o Bupropion - pt reports still taking. No fill history found in [facility]. Per [facility], last filled August 2019. A new rx was sent in December 2020, but not filled.    Deleted: per patient report and fill history  o Diazepam (5-day supply prescribed in January 2021, therapy completed)  o Probiotic capsule (taking probiotic yogurt instead)  o Melatonin    Changed: per patient report  o Omeprazole PO --> Omeprazole 40 mg daily    Additional Information:    None    Prior to Admission medications    Medication Sig Last Dose Taking? Auth Provider   albuterol (PROAIR HFA/PROVENTIL HFA/VENTOLIN HFA) 108 (90 Base) MCG/ACT inhaler Inhale 2 puffs into the lungs every 6 hours 3/30/2021 at Unknown time Yes Lou Draper MD   buPROPion (WELLBUTRIN XL) 300 MG 24 hr tablet Take 1 tablet (300 mg) by mouth daily Past Week at Unknown time Yes Lou Draper MD   Cyanocobalamin 2500 MCG TABS Place 2,500 mcg under the tongue daily  Past Week at Unknown time Yes Reported, Patient   hydrOXYzine (VISTARIL) 25 MG capsule Take 25 mg by mouth 2 times daily as needed for anxiety Past Month at Unknown time Yes Unknown, Entered By History   omeprazole (PRILOSEC) 40 MG DR capsule Take 40 mg by mouth every morning  3/30/2021 at AM Yes Reported, Patient   EPINEPHrine (ANY BX GENERIC EQUIV) 0.3 MG/0.3ML injection 2-pack Inject 0.3 mLs (0.3 mg) into the muscle as needed for anaphylaxis Unknown at Unknown time  Lou Draper MD       Date completed:  03/31/21    Medication history completed by:     Nicollette McMann, Radha  [facility]  Emergency Department: Ascom *[id number]

## 2021-04-01 LAB
ALBUMIN SERPL-MCNC: 3.2 G/DL (ref 3.4–5)
ALBUMIN UR-MCNC: NEGATIVE MG/DL
ALP SERPL-CCNC: 82 U/L (ref 40–150)
ALT SERPL W P-5'-P-CCNC: 18 U/L (ref 0–50)
AMPHETAMINES UR QL SCN: POSITIVE
ANION GAP SERPL CALCULATED.3IONS-SCNC: 5 MMOL/L (ref 3–14)
APPEARANCE UR: ABNORMAL
AST SERPL W P-5'-P-CCNC: 8 U/L (ref 0–45)
BACTERIA #/AREA URNS HPF: ABNORMAL /HPF
BARBITURATES UR QL: NEGATIVE
BASOPHILS # BLD AUTO: 0.1 10E9/L (ref 0–0.2)
BASOPHILS NFR BLD AUTO: 0.8 %
BENZODIAZ UR QL: NEGATIVE
BILIRUB SERPL-MCNC: 0.3 MG/DL (ref 0.2–1.3)
BILIRUB UR QL STRIP: NEGATIVE
BUN SERPL-MCNC: 22 MG/DL (ref 7–30)
CALCIUM SERPL-MCNC: 8.8 MG/DL (ref 8.5–10.1)
CANNABINOIDS UR QL SCN: NEGATIVE
CHLORIDE SERPL-SCNC: 108 MMOL/L (ref 94–109)
CHOLEST SERPL-MCNC: 250 MG/DL
CO2 SERPL-SCNC: 27 MMOL/L (ref 20–32)
COCAINE UR QL: NEGATIVE
COLOR UR AUTO: ABNORMAL
CREAT SERPL-MCNC: 0.76 MG/DL (ref 0.52–1.04)
DEPRECATED CALCIDIOL+CALCIFEROL SERPL-MC: 7 UG/L (ref 20–75)
DIFFERENTIAL METHOD BLD: NORMAL
EOSINOPHIL # BLD AUTO: 0.4 10E9/L (ref 0–0.7)
EOSINOPHIL NFR BLD AUTO: 5.8 %
ERYTHROCYTE [DISTWIDTH] IN BLOOD BY AUTOMATED COUNT: 12.7 % (ref 10–15)
ETHANOL UR QL SCN: NEGATIVE
GFR SERPL CREATININE-BSD FRML MDRD: >90 ML/MIN/{1.73_M2}
GLUCOSE SERPL-MCNC: 94 MG/DL (ref 70–99)
GLUCOSE UR STRIP-MCNC: NEGATIVE MG/DL
HCT VFR BLD AUTO: 40.9 % (ref 35–47)
HDLC SERPL-MCNC: 52 MG/DL
HGB BLD-MCNC: 13.5 G/DL (ref 11.7–15.7)
HGB UR QL STRIP: ABNORMAL
IMM GRANULOCYTES # BLD: 0 10E9/L (ref 0–0.4)
IMM GRANULOCYTES NFR BLD: 0.3 %
KETONES UR STRIP-MCNC: NEGATIVE MG/DL
LDLC SERPL CALC-MCNC: 162 MG/DL
LEUKOCYTE ESTERASE UR QL STRIP: ABNORMAL
LYMPHOCYTES # BLD AUTO: 3 10E9/L (ref 0.8–5.3)
LYMPHOCYTES NFR BLD AUTO: 46 %
MCH RBC QN AUTO: 29.7 PG (ref 26.5–33)
MCHC RBC AUTO-ENTMCNC: 33 G/DL (ref 31.5–36.5)
MCV RBC AUTO: 90 FL (ref 78–100)
MONOCYTES # BLD AUTO: 0.4 10E9/L (ref 0–1.3)
MONOCYTES NFR BLD AUTO: 6.7 %
MUCOUS THREADS #/AREA URNS LPF: PRESENT /LPF
NEUTROPHILS # BLD AUTO: 2.7 10E9/L (ref 1.6–8.3)
NEUTROPHILS NFR BLD AUTO: 40.4 %
NITRATE UR QL: NEGATIVE
NONHDLC SERPL-MCNC: 198 MG/DL
NRBC # BLD AUTO: 0 10*3/UL
NRBC BLD AUTO-RTO: 0 /100
OPIATES UR QL SCN: NEGATIVE
PH UR STRIP: 5 PH (ref 5–7)
PLATELET # BLD AUTO: 228 10E9/L (ref 150–450)
POTASSIUM SERPL-SCNC: 3.9 MMOL/L (ref 3.4–5.3)
PROT SERPL-MCNC: 6.5 G/DL (ref 6.8–8.8)
RBC # BLD AUTO: 4.55 10E12/L (ref 3.8–5.2)
RBC #/AREA URNS AUTO: 9 /HPF (ref 0–2)
SODIUM SERPL-SCNC: 140 MMOL/L (ref 133–144)
SOURCE: ABNORMAL
SP GR UR STRIP: 1.02 (ref 1–1.03)
SQUAMOUS #/AREA URNS AUTO: 11 /HPF (ref 0–1)
TRIGL SERPL-MCNC: 179 MG/DL
TSH SERPL DL<=0.005 MIU/L-ACNC: 1.9 MU/L (ref 0.4–4)
UROBILINOGEN UR STRIP-MCNC: NORMAL MG/DL (ref 0–2)
VIT B12 SERPL-MCNC: 260 PG/ML (ref 193–986)
WBC # BLD AUTO: 6.6 10E9/L (ref 4–11)
WBC #/AREA URNS AUTO: 41 /HPF (ref 0–5)

## 2021-04-01 PROCEDURE — 87086 URINE CULTURE/COLONY COUNT: CPT | Performed by: STUDENT IN AN ORGANIZED HEALTH CARE EDUCATION/TRAINING PROGRAM

## 2021-04-01 PROCEDURE — 250N000013 HC RX MED GY IP 250 OP 250 PS 637: Performed by: STUDENT IN AN ORGANIZED HEALTH CARE EDUCATION/TRAINING PROGRAM

## 2021-04-01 PROCEDURE — 80320 DRUG SCREEN QUANTALCOHOLS: CPT | Performed by: STUDENT IN AN ORGANIZED HEALTH CARE EDUCATION/TRAINING PROGRAM

## 2021-04-01 PROCEDURE — 81001 URINALYSIS AUTO W/SCOPE: CPT | Performed by: STUDENT IN AN ORGANIZED HEALTH CARE EDUCATION/TRAINING PROGRAM

## 2021-04-01 PROCEDURE — 87088 URINE BACTERIA CULTURE: CPT | Performed by: STUDENT IN AN ORGANIZED HEALTH CARE EDUCATION/TRAINING PROGRAM

## 2021-04-01 PROCEDURE — 124N000002 HC R&B MH UMMC

## 2021-04-01 PROCEDURE — 80061 LIPID PANEL: CPT | Performed by: STUDENT IN AN ORGANIZED HEALTH CARE EDUCATION/TRAINING PROGRAM

## 2021-04-01 PROCEDURE — 87491 CHLMYD TRACH DNA AMP PROBE: CPT | Performed by: STUDENT IN AN ORGANIZED HEALTH CARE EDUCATION/TRAINING PROGRAM

## 2021-04-01 PROCEDURE — 80053 COMPREHEN METABOLIC PANEL: CPT | Performed by: STUDENT IN AN ORGANIZED HEALTH CARE EDUCATION/TRAINING PROGRAM

## 2021-04-01 PROCEDURE — 84443 ASSAY THYROID STIM HORMONE: CPT | Performed by: STUDENT IN AN ORGANIZED HEALTH CARE EDUCATION/TRAINING PROGRAM

## 2021-04-01 PROCEDURE — 85025 COMPLETE CBC W/AUTO DIFF WBC: CPT | Performed by: STUDENT IN AN ORGANIZED HEALTH CARE EDUCATION/TRAINING PROGRAM

## 2021-04-01 PROCEDURE — 82306 VITAMIN D 25 HYDROXY: CPT | Performed by: STUDENT IN AN ORGANIZED HEALTH CARE EDUCATION/TRAINING PROGRAM

## 2021-04-01 PROCEDURE — 87591 N.GONORRHOEAE DNA AMP PROB: CPT | Performed by: STUDENT IN AN ORGANIZED HEALTH CARE EDUCATION/TRAINING PROGRAM

## 2021-04-01 PROCEDURE — 82607 VITAMIN B-12: CPT | Performed by: STUDENT IN AN ORGANIZED HEALTH CARE EDUCATION/TRAINING PROGRAM

## 2021-04-01 PROCEDURE — 36415 COLL VENOUS BLD VENIPUNCTURE: CPT | Performed by: STUDENT IN AN ORGANIZED HEALTH CARE EDUCATION/TRAINING PROGRAM

## 2021-04-01 PROCEDURE — 80307 DRUG TEST PRSMV CHEM ANLYZR: CPT | Performed by: STUDENT IN AN ORGANIZED HEALTH CARE EDUCATION/TRAINING PROGRAM

## 2021-04-01 RX ORDER — LIDOCAINE 4 G/G
1 PATCH TOPICAL
Status: DISCONTINUED | OUTPATIENT
Start: 2021-04-01 | End: 2021-04-02 | Stop reason: HOSPADM

## 2021-04-01 RX ORDER — LIDOCAINE 4 G/G
1 PATCH TOPICAL
Status: DISCONTINUED | OUTPATIENT
Start: 2021-04-01 | End: 2021-04-01

## 2021-04-01 RX ADMIN — LIDOCAINE 1 PATCH: 560 PATCH PERCUTANEOUS; TOPICAL; TRANSDERMAL at 20:37

## 2021-04-01 RX ADMIN — ALBUTEROL SULFATE 2 PUFF: 90 AEROSOL, METERED RESPIRATORY (INHALATION) at 20:37

## 2021-04-01 RX ADMIN — HYDROXYZINE HYDROCHLORIDE 25 MG: 25 TABLET, FILM COATED ORAL at 19:13

## 2021-04-01 RX ADMIN — BUPROPION HYDROCHLORIDE 300 MG: 300 TABLET, FILM COATED, EXTENDED RELEASE ORAL at 09:19

## 2021-04-01 RX ADMIN — Medication 2500 MCG: at 09:34

## 2021-04-01 RX ADMIN — ALBUTEROL SULFATE 2 PUFF: 90 AEROSOL, METERED RESPIRATORY (INHALATION) at 09:38

## 2021-04-01 RX ADMIN — ACETAMINOPHEN 650 MG: 325 TABLET, FILM COATED ORAL at 09:20

## 2021-04-01 RX ADMIN — NICOTINE POLACRILEX 4 MG: 2 GUM, CHEWING BUCCAL at 19:13

## 2021-04-01 RX ADMIN — TRAZODONE HYDROCHLORIDE 50 MG: 50 TABLET ORAL at 20:38

## 2021-04-01 RX ADMIN — PANTOPRAZOLE SODIUM 40 MG: 40 TABLET, DELAYED RELEASE ORAL at 09:18

## 2021-04-01 RX ADMIN — PRAZOSIN HYDROCHLORIDE 1 MG: 1 CAPSULE ORAL at 20:45

## 2021-04-01 NOTE — ED NOTES
Pt has continued to sleep through her stay in [facility]. When pt is awake she is terse towards staff and short tempered. When awake pt was unable to give urine sample.
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
## 2021-04-01 NOTE — PROGRESS NOTES
"Pt is 40 year old female. Per report pt came in for psychosis. Pt reported increased A/V hallucinations. Per report pt has been seeking things in the amol and on the corner of her eyes. Pt hx of PTSD, Borderline Features, MDD with psychosis vs Schizoaffective d/o and stimulant abuse. Pt arrived in station 20 carrying her dinner try from the [facility] and was still eating. Pt was calm but became very agitated during search saying that wearing the gown will trigger her PTSD. Pt also was upset because she was not allowed to bring her own pillow that she had brought from home to the unit. Pt agreed for the search and came to the unit but was very irritable. Pt stated if she does not get her pillow \"I will just check myself out in the morning\". It was explained to the pt that it is unit policy not to allow things from home and the current covid situation. Pt had an interview with the doctor and after that went to be stating \"I am tired I need to sleep\". Pt could not do the admission interview. Pt took her medications. Pt contracted for safety while in the hospital. Pt is voluntary and covid test was negative.  "

## 2021-04-01 NOTE — PLAN OF CARE
Problem: Behavioral Health Plan of Care  Goal: Plan of Care Review  Outcome: No Change     Pt is seen mostly sleeping in her room. Stated that she is sleepy and tired today. Came out for meals. Has good appetite. Denies SI/SIB, denies auditory or visual hallucinations. Pt endorses feeling better today. Complained of back ache in the morning. PRN Tylenol given. At lunch, pt complained of head ache, refused offer of  PRN.     Will continue to monitor.

## 2021-04-01 NOTE — PROGRESS NOTES
"    ----------------------------------------------------------------------------------------------------------  [facility], [place]   Psychiatric Progress Note  Hospital Day #1     Interim History:   The patient's care was discussed with the treatment team and chart notes were reviewed.    Sleep: 7 hours (04/01/21 0646)  Scheduled Medications: taken as prescribed  PRN Medications: Hydroxyzine    Staff Report: No acute behavioral concerns. Irritable. Patient became agitated during admission search saying that wearing the gown will trigger her PTSD. Also became upset that she cannot use her personal pillow from home.       Patient Interview: Patient was interviewed in her personal room.  La Nena reports that she is feeling \"crappy\".  This is because of a headache and backache, which she attributes to not being able to use her ergonomic pillow previously recommended by her outpatient doctor.  La Nena reports that she would like to get \"ECT as soon as possible\".  She completed an ECT consult with Dr. Cerrato 3 weeks ago, but was unable to complete the physical required for ECT.  After her appointment with Dr. Cerrato, La Nena felt ashamed of her methamphetamine use and worried that the physical exam for ECT clearance would reveal her substance use.  She reports that she tried to quit methamphetamines, but she was unable.    La Nena first used methamphetamines when she was a teenager, and she was in sustained remission, until she had a relapse about 6 months ago after she becoming depressed following her son moving out and mistakenly trying cannabis laced with meth.  Since that time she has been using methamphetamines on 3-4-day binges, and she reports that after day 4 she starts to feel \"useless\" and then sleeps for days.    Additionally, these reports that she had a sustained and unpleasant nightmare last night.  She has also been experiencing auditory hallucinations of people talking, as well as visual " "hallucinations of her door spontaneously opening.  She feels that these hallucinations may be related to her childhood trauma.    Overall, La Nena is hoping to initiate ECT soon, before she fully crashes from the methamphetamine and uses again, as per her typical pattern.      The risks, benefits, alternatives and side effects of any medication changes have been discussed and are understood by the patient and other caregivers.    Review of systems:     ROS was negative unless noted above.          Allergies:     Allergies   Allergen Reactions     Compazine [Prochlorperazine] Unknown     Artificial Sweetner (Informational Only) [Artificial Sweetner (Informational Only) ]      Bees      Haldol [Haloperidol] Unknown     anxiety            Psychiatric Examination:   /81   Pulse 95   Temp 98.4  F (36.9  C) (Oral)   Resp 16   Ht 1.676 m (5' 6\")   Wt 107.8 kg (237 lb 11.2 oz)   SpO2 96%   BMI 38.37 kg/m    Weight is 237 lbs 11.2 oz  Body mass index is 38.37 kg/m .    Appearance:  fatigued and slightly unkempt  Muscle Strength and Tone: normal tone with no noticeable atrophy  Gait and Station: normal gait, intact station  Behavior (Psychomotor):  no evidence of tardive dyskinesia, dystonia, or tics and fidgeting  Eye Contact:  good  Speech:  clear, coherent, normal prosody  Mood:  \"crappy\"  Affect:  intensity is blunted, tired, dysthymic, anxious and irritable  Attitude:  cooperative and calm  Thought Process:  logical and linear, able to make goals known  Thought Content:  no evidence of suicidal ideation or homicidal ideation, no evidence of psychotic thought, auditory hallucinations present and visual hallucinations present  Associations:  no loose associations  Insight:  fair- actively seeking help prior to meth relapse; thinks ECT will fix her meth addiction  Judgment:  fair, wants help yet willing to discharge in order to use pillow  Oriented to:  time, person, and place  Attention Span and Concentration:  " intact  Recent and Remote Memory:  intact  Language: Fluent in English with appropriate syntax and vocabulary.  Fund of Knowledge: appropriate         Labs:     Results for orders placed or performed during the hospital encounter of 03/31/21 (from the past 24 hour(s))   Asymptomatic SARS-CoV-2 COVID-19 Virus (Coronavirus) by PCR    Specimen: Nasopharyngeal   Result Value Ref Range    SARS-CoV-2 Virus Specimen Source Nasopharyngeal     SARS-CoV-2 PCR Result NEGATIVE     SARS-CoV-2 PCR Comment (Note)    EKG 12-lead, tracing only   Result Value Ref Range    Interpretation ECG Click View Image link to view waveform and result             Assessment    Principal Diagnosis:   # Complex PTSD  # MDD, recurrent, severe with psychosis vs. Schizoaffective disorder    Secondary psychiatric diagnoses of concern this admission:   # Stimulant use disorder (methamphetamines), unspecified severity    Diagnostic Impression:    La Nena Newton is a 40 year old female previously diagnosed with, early age onset of depression; psychosis; severe, chronic PTSD;  stimulant use disorder; ADHD; and BPD who presents with distressing auditory hallucinations and depressive symptoms in the context of methamphetamine use. Substances are a contributing factor to their presentation.  Biological contributions to mental health presentation include substance use, history of childhood trauma. Psychosocial factors contributing to current presentation include unstable housing, lack of meaningful occupation, son moving out, pandemic isolation, cessation of EMDR and ECT.                The patient's presentation is consistent with complex PTSD, with longstanding hallucinations exacerbated by methamphetamine use. With methamphetamine washout, we will see if there is underlying psychosis and depressive symptomology. She has not greatly enefitted with pharmacological options, and she is hoping to start ECT treatment this hospitalization. It could also be  consistent with historic diagnosis of schizoaffective disorder, with current episode depressed (as evidenced by anhedonia, poor sleep, low energy, poor concentration, and feelings of worthlessness) vs. MDD with psychosis vs. substance-induced mood disorder.    Hospital course: La Nena Newton was admitted to station 20 as a voluntary patient with attending Dr. Chago Penn. PTA medications were continued and Prazosin was initated at 1mg at bedtime for nightmares. ECT consult and IM consult for ECT medical clearance placed for initiation of ECT as inpatient, since patient was previously pursing this as outpatient but unable to follow through.     Discontinued Medications (& Rationale):  None    Medical course: Patient was medically cleared prior to admission. No medical issues have come up. IM consult placed for medical clearance for ECT.    Plan   Today's changes:  -ECT consult placed with page to Dr. Cerrato and call to ECT resident (Dr. Hernandez)  -IM consult for ECT clearance  -No personal pillow, per unit policy    Psychotropic Medications:  Scheduled Meds:  -Bupropion XL 300mg daily  -Nicotine patch 21mg/24hr  -Vitamin B12 2.5 mg SL  -Prazosin 1mg at bedtime      PRN Meds:  -Hydroxyzine 25mg q4H, for anxiety  -Nicorette gum 4mg q4H, for tobacco cessation  -Olanzapine 10mg PO/IM TID , for severe agitation  -Trazodone 50mg, for sleep      Patient will be treated in therapeutic milieu with appropriate individual and group therapies as described.      Medical diagnoses:    # Asthma  - PTA albuterol  # GERD  - PTA omeprazole  #History of large bowel perforation and dudenal ulcers  - Limit NSAIDs and acidic foods    Data: CMP, CBC, B12, TSH all within normal limits; Lipid with LDL and total cholesterol elevated; vitamin D low (7).  Consults: ECT, IM (for ECT clearance)    Legal Status: Voluntary    Safety Assessment:   Behavioral Orders   Procedures     Code 1 - Restrict to Unit     Discontinue 1:1 attendant for suicide  risk     Order Specific Question:   I have performed an in person assessment of the patient     Answer:   Based on this assessment the patient no longer requires a one on one attendant at this point in time.     Order Specific Question:   Rationale     Answer:   Medical Record Reviewed     Order Specific Question:   Rationale     Answer:   Patient States able to remain safe in hospital     Order Specific Question:   Rationale     Answer:   Modifications to care environment made to mitigate safety risk     Order Specific Question:   Rationale     Answer:   Routine observations are sufficient to monitor safety.     Routine Programming     As clinically indicated     Status 15     Every 15 minutes.     Suicide precautions     Patients on Suicide Precautions should have a Combination Diet ordered that includes a Diet selection(s) AND a Behavioral Tray selection for Safe Tray - with utensils, or Safe Tray - NO utensils         Disposition: 5-7 days, Pending stabilization & development of a safe discharge plan.    The patient was seen and discussed with the attending physician, Dr. Penn.    Chip Little MD, PhD  PGY-1 Psychiatry Resident      Attending Attestation:    IChago M.D., saw and evaluated the patient with the resident physician. I have reviewed all labs and vital signs. I have reviewed the patient's care with the multidisciplinary treatment team, both before and after the interview.

## 2021-04-01 NOTE — PROGRESS NOTES
La Nena appeared to sleep a total of 7 hours this night shift.  She has fasting labs this morning.  No snacks or prns given or requested during night shift.

## 2021-04-01 NOTE — PLAN OF CARE
BEHAVIORAL TEAM DISCUSSION    Participants: Dr. Penn, Psychiatry Resident, Ghazala AVILA, Lanette [facility] CTC  Progress: New patient assessment  Anticipated length of stay: Patient came to the ED in the context of auditory hallucinations and depressive symptoms. Patient needs further clinical stabilization and medication management.   Continued Stay Criteria/Rationale: 3-5 days. Needs evaluation for ECT. Needs further clinical stabilization and medication management.   Medical/Physical: See H&P note  Precautions:   Behavioral Orders   Procedures     Code 1 - Restrict to Unit     Discontinue 1:1 attendant for suicide risk     Order Specific Question:   I have performed an in person assessment of the patient     Answer:   Based on this assessment the patient no longer requires a one on one attendant at this point in time.     Order Specific Question:   Rationale     Answer:   Medical Record Reviewed     Order Specific Question:   Rationale     Answer:   Patient States able to remain safe in hospital     Order Specific Question:   Rationale     Answer:   Modifications to care environment made to mitigate safety risk     Order Specific Question:   Rationale     Answer:   Routine observations are sufficient to monitor safety.     Routine Programming     As clinically indicated     Status 15     Every 15 minutes.     Suicide precautions     Patients on Suicide Precautions should have a Combination Diet ordered that includes a Diet selection(s) AND a Behavioral Tray selection for Safe Tray - with utensils, or Safe Tray - NO utensils       Plan: Patient will have ongoing psychiatric assessment. Medications will be reviewed and adjusted per MD as indicated. Outpatient providers will be contacted for care coordination. CTC will continue to assess needs and  ensure appropriate follow up care is in place.  Rationale for change in precautions or plan: None
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
## 2021-04-01 NOTE — PROGRESS NOTES
03/31/21 2121   Patient Belongings   Patient Belongings locker;sent to security per site process   Patient Belongings Remaining with Patient clothing;purse/wallet;shoes;wallet;keys   Patient Belongings Put in Hospital Secure Location (Security or Locker, etc.) cash/credit card   Belongings Search Yes   Clothing Search Yes   Second Staff Viviane LI       In pt locker:  1 black pants   2 bras  1 t-shirt  1 black facemask.  1 pink underwear  1 tie die pants  1 grey sweatpants   1 grey nike sweater  1 grey t-shirt  1 tan underwear  1 AC/DC shirt  1 black yoga pants  5 pairs of socks  1 deodrant   1 tube of conditioner  1 black sweater  1 pair of shoes  1 black backpack  1 hair tie  1 pair of slippers  1 dark grey pillow  1 blue journal  1 pair of sunglasses   1 bandana face masks  1 pack of sunchips  1 box of cigarettes  1 wallet   3 lighters  1 pair of reading glasses  2 pencils  1 key chain with keys  6 items of makeup    Items sent to security:  1 [place] Drivers license  1 [place] bank card ([id number])  1 [employer] visa card ([id number])  1 [facility] Visa card ([id number])    A               Admission:  I am responsible for any personal items that are not sent to the safe or pharmacy.  [facility] is not responsible for loss, theft or damage of any property in my possession.    Signature:  _________________________________ Date: _______  Time: _____                                              Staff Signature:  ____________________________ Date: ________  Time: _____      2nd Staff person, if patient is unable/unwilling to sign:    Signature: ________________________________ Date: ________  Time: _____     Discharge:  [facility] has returned all of my personal belongings:    Signature: _________________________________ Date: ________  Time: _____                                          Staff Signature:  ____________________________ Date: ________  Time: _____

## 2021-04-01 NOTE — PLAN OF CARE
"  Problem: Behavioral Health Plan of Care  Goal: Patient-Specific Goal (Individualization)  Flowsheets (Taken 4/1/2021 3674)  Patient Vulnerabilities:    history of unsuccessful treatment    poor physical health    lacks insight into illness    adverse childhood experience(s)    occupational insecurity    poor impulse control  Patient Personal Strengths:    realistic evaluation of current/future capabilities    resourceful    community support    expressive of needs  Note:   Personal Plan of Care    Patient goals:  Feel stable  \"ECT as soon as possible\".      Plan for admission:  1. stabilization of mood disorder symptoms.   2. Absence of SI/Safe with self  3. Medication management per Mds  4. Coordination of Care with outpatient providers/family  5. Psychiatric follow up care in place    For CD:    Monitor for symptoms of withdrawal  Complete CD assessment/plan for sobriety              "

## 2021-04-02 VITALS
WEIGHT: 237.7 LBS | RESPIRATION RATE: 16 BRPM | HEIGHT: 66 IN | OXYGEN SATURATION: 95 % | SYSTOLIC BLOOD PRESSURE: 126 MMHG | TEMPERATURE: 97.8 F | BODY MASS INDEX: 38.2 KG/M2 | DIASTOLIC BLOOD PRESSURE: 86 MMHG | HEART RATE: 101 BPM

## 2021-04-02 LAB
C TRACH DNA SPEC QL NAA+PROBE: POSITIVE
INTERPRETATION ECG - MUSE: NORMAL
N GONORRHOEA DNA SPEC QL NAA+PROBE: NEGATIVE
SPECIMEN SOURCE: ABNORMAL
SPECIMEN SOURCE: NORMAL

## 2021-04-02 PROCEDURE — 99232 SBSQ HOSP IP/OBS MODERATE 35: CPT | Performed by: PHYSICIAN ASSISTANT

## 2021-04-02 PROCEDURE — 99222 1ST HOSP IP/OBS MODERATE 55: CPT | Mod: 25 | Performed by: PSYCHIATRY & NEUROLOGY

## 2021-04-02 PROCEDURE — 99207 PR CONSULT E&M CHANGED TO SUBSEQUENT LEVEL: CPT | Performed by: PHYSICIAN ASSISTANT

## 2021-04-02 PROCEDURE — 250N000013 HC RX MED GY IP 250 OP 250 PS 637: Performed by: STUDENT IN AN ORGANIZED HEALTH CARE EDUCATION/TRAINING PROGRAM

## 2021-04-02 RX ORDER — DOXYCYCLINE HYCLATE 100 MG
100 TABLET ORAL 2 TIMES DAILY
Qty: 14 TABLET | Refills: 0 | Status: SHIPPED | OUTPATIENT
Start: 2021-04-02 | End: 2021-04-09

## 2021-04-02 RX ORDER — PRAZOSIN HYDROCHLORIDE 1 MG/1
1 CAPSULE ORAL AT BEDTIME
Qty: 30 CAPSULE | Refills: 0 | Status: SHIPPED | OUTPATIENT
Start: 2021-04-02 | End: 2021-05-02

## 2021-04-02 RX ORDER — ERGOCALCIFEROL 1.25 MG/1
50000 CAPSULE, LIQUID FILLED ORAL
Status: DISCONTINUED | OUTPATIENT
Start: 2021-04-02 | End: 2021-04-02 | Stop reason: HOSPADM

## 2021-04-02 RX ADMIN — HYDROXYZINE HYDROCHLORIDE 25 MG: 25 TABLET, FILM COATED ORAL at 09:52

## 2021-04-02 RX ADMIN — BUPROPION HYDROCHLORIDE 300 MG: 300 TABLET, FILM COATED, EXTENDED RELEASE ORAL at 09:51

## 2021-04-02 RX ADMIN — PANTOPRAZOLE SODIUM 40 MG: 40 TABLET, DELAYED RELEASE ORAL at 09:52

## 2021-04-02 RX ADMIN — ALBUTEROL SULFATE 2 PUFF: 90 AEROSOL, METERED RESPIRATORY (INHALATION) at 09:57

## 2021-04-02 RX ADMIN — NICOTINE POLACRILEX 4 MG: 2 GUM, CHEWING BUCCAL at 08:33

## 2021-04-02 RX ADMIN — NICOTINE 1 PATCH: 21 PATCH, EXTENDED RELEASE TRANSDERMAL at 09:52

## 2021-04-02 ASSESSMENT — ACTIVITIES OF DAILY LIVING (ADL)
HYGIENE/GROOMING: INDEPENDENT
DRESS: INDEPENDENT
ORAL_HYGIENE: INDEPENDENT

## 2021-04-02 NOTE — PROGRESS NOTES
Pt denies SI/SIB and hallucinations. Pt was sleeping the majority of the shift. Pt woke up after dinner and came out appearing irritable and loudly demanding for her meal. Pt stated she was upset about what was for dinner and asked for patient relations number to file a complaint. Pt is calm at this point in the shift.

## 2021-04-02 NOTE — PROGRESS NOTES
"Irritable and upset this morning.  Reports not sleeping well and neck discomfort with not being able to use pillow from home.  Signed 12 hour intent to leave at 0949, states would rescind intent to leave if she is able to have ECT scheduled.  States \"I am not staying all weekend if they are not going to do ECT\".  Requested and received PRN hydroxyzine for c/o increasing anxiety.   "
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
## 2021-04-02 NOTE — CONSULTS
"  [facility]  Internal Medicine Consult    La Nena Newton MRN# [id number]   Age: 40 year old YOB: 1980     Date of Admission: 3/31/2021  Date of Consult:  4/2/2021    Requesting Service: Behavioral Health - Chago Penn MD  Reason for Consult: Pre ECT Medicine Evaluation   Indication for ECT: MDD, PTSD    Chief Complaint: methamphetimine use, MDD, PTSD  HPI: La Nena is a 40F admitted to psychiatry from ED for MDD, PTSD and methamphetamine use and is planned to receive ECT treatment, and medicine is asked to assess her medically    She is having a pain over her spine in the mid-back that she states is a result of not having her usual pillow and regular pain medications.     She otherwise denies complaints or changes.     Review of Systems:   Cardiovascular: negative, negative for, palpitations, tachycardia, chest pain, orthopnea and lower extremity edema  Pulmonary: No shortness of breath, No cough and + history of stable asthma  Neurological: negative, negative for, syncope, stroke, seizures and speech problems    Past Medical History:   Prior Anesthesia: Yes  If yes, any complications: No    Prior ECT: Yes (started around age 17)  If yes,   2018  Response: symptom improvement: improved mood \"I could see the birds\"  Side Effects: none    Cardiovascular: CAD No, MI No, HTN No, CHF No, pacemaker or ICD No  Pulmonary: Asthma/COPD Yes: moderate persistent asthma  Prior respiratory failure or need for emergent intubation No, On theophylline No (note that theophylline use is a contraindication to proceeding with ECT, needs to be tapered off prior)  Neurological: Brain tumor No, TIA/CVA No, Dementia No, Neuromuscular Disease (including post polio syndrome) No, Seizures and/or Epilepsy No, Head Injury No, Intracranial Hemorrhage No  Diabetes: No    Past Surgical History:   History reviewed. No pertinent surgical history.    Allergies:      Allergies   Allergen Reactions     Compazine " [Prochlorperazine] Unknown     Artificial Sweetner (Informational Only) [Artificial Sweetner (Informational Only) ]      Bees      Haldol [Haloperidol] Unknown     anxiety       Medication list reviewed.  albuterol (PROAIR HFA/PROVENTIL HFA/VENTOLIN HFA) 108 (90 Base) MCG/ACT inhaler Inhale 2 puffs into the lungs every 6 hours Fabián Perry MD Reordered   Ordered as: albuterol (PROAIR HFA/PROVENTIL HFA/VENTOLIN HFA) 108 (90 Base) MCG/ACT inhaler 2 puff - 2 puff, Inhalation, EVERY 6 HOURS, First dose on Wed 3/31/21 at 2100 Check the dose counter on the inhaler to ensure there are doses remaining before administering.    buPROPion (WELLBUTRIN XL) 300 MG 24 hr tablet Take 1 tablet (300 mg) by mouth daily Fabián Perry MD Not Ordered   Cyanocobalamin 2500 MCG TABS Place 2,500 mcg under the tongue daily  Fabián Perry MD Reordered   Ordered as: cyanocobalamin (VITAMIN B-12) sublingual tablet SUBL 2,500 mcg - 2,500 mcg, Sublingual, DAILY, First dose on Thu 4/1/21 at 0800   hydrOXYzine (VISTARIL) 25 MG capsule Take 25 mg by mouth 2 times daily as needed for anxiety Fabián Perry MD Not Ordered   Ordered as: hydrOXYzine (ATARAX) tablet 25 mg - 25 mg, Oral, 2 TIMES DAILY PRN, anxiety, Starting Wed 3/31/21 at 2037 (Discontinued)   omeprazole (PRILOSEC) 40 MG DR capsule Take 40 mg by mouth every morning  Fabián Perry MD Reordered   Ordered as: pantoprazole (PROTONIX) EC tablet 40 mg - 40 mg, Oral, 2 TIMES DAILY BEFORE MEALS, First dose on Thu 4/1/21 at 0730 Formulary alternate for PTA omeprazole 40 mg daily.    EPINEPHrine (ANY BX GENERIC EQUIV) 0.3 MG/0.3ML injection 2-pack Inject 0.3 mLs (0.3 mg) into the muscle as needed for anaphylaxis Fabián Perry MD Reordered   Ordered as: EPINEPHrine (ANY BX GENERIC EQUIV) injection 0.3 mg - 0.3 mg, Intramuscular, ONCE PRN, anaphylaxis, Starting Wed 3/31/21 at 2037, For 1 dose       Physical Exam:  /86   Pulse 101   Temp 97.8  F (36.6  C) (Oral)   " Resp 16   Ht 1.676 m (5' 6\")   Wt 107.8 kg (237 lb 11.2 oz)   SpO2 98%   BMI 38.37 kg/m     Gen: ambulatory without assistance, NAD  Cardiovascular: RRR. S1, S2. No murmurs, rubs, gallops.   Pulmonary: Effort normal. Lungs CTAB with no wheezing, rales, rhonchi.   Neurological: A&Ox3. No focal deficits. Pupils equal and round EOMI. CN2-12 grossly intact  MSK: 5/5 strength bilaterl UE and LE  GI: NABS, soft, NT  BACK: tenderness over spine ~T12-L5, no deformity/stepoffs, no lesions, no CVA tenderness bilaterally  Skin: CDI, noncyanotic, anicteric    Data:  EKG:Normal and no QTc prolongation, no ST-T wave abnormalities, Normal sinus rhythm   normal EKG, normal sinus rhythm    Head Imaging: CT head w/o cn  2006 no evidence of intracranial mass or trauma, no recent imaging    Labs were obtained within the last 30 days on 4/1/21 and are as follows:   CBC:  Recent Labs   Lab Test 04/01/21  0741   WBC 6.6   RBC 4.55   HGB 13.5   HCT 40.9   MCV 90   MCH 29.7   MCHC 33.0   RDW 12.7        CMP:  Recent Labs   Lab Test 04/01/21  0741      POTASSIUM 3.9   CHLORIDE 108   DECLAN 8.8   CO2 27   BUN 22   CR 0.76   GLC 94   AST 8   ALT 18   BILITOTAL 0.3   ALBUMIN 3.2*   PROTTOTAL 6.5*   ALKPHOS 82     HCG:   pending    Recommendations:   Diuretics and oral hypoglycemics should be held the morning of ECT--> currently the patient is not taking these medications    All other antihypertensive medications can be continued.     Patient DOES NOT (however please ensure negative hcg prior to proceding with ECT) have absolute medical contraindications to proceeding with ECT at this time. Treatment plan per psychiatry.     Johana Rothman PA-C  [facility]  Contact information available via [facility] Paging/Directory    "

## 2021-04-02 NOTE — DISCHARGE INSTRUCTIONS
Behavioral Discharge Planning and Instructions    Summary: You were admitted on 3/31/2021  due to auditory hallucinations and depressive symptoms .  You were treated by Dr. Chago Penn and discharged on 4/2/2021 from 20 to Home    Main Diagnosis: Depression; psychosis; severe, chronic PTSD;  stimulant use disorder; ADHD; and BPD     Health Care Follow-up:   Appointment Date/Time: 4/14/2021 at 1:00 PM. ***Please call the clinic if this appointment date/time does not work for you***  [facility]Psychiatrist: Dr. Garcia       Phone Number: [phone]      [facility] Chemical Health Program: Staff can provide information on alcohol and chemical dependency, diagnostic assessment and evaluation, treatment referral services, and individual or family counseling on chemical use and abuse. Call [phone] to access services and discuss eligibility for funding for chemical dependency treatment.        Attend all scheduled appointments with your outpatient providers. Call at least 24 hours in advance if you need to reschedule an appointment to ensure continued access to your outpatient providers.     Major Treatments, Procedures and Findings:  You were provided with: a psychiatric assessment, assessed for medical stability, medication evaluation and/or management, group therapy and milieu management    Symptoms to Report: feeling more aggressive, increased confusion, losing more sleep, mood getting worse or thoughts of suicide    Early warning signs can include: increased depression or anxiety sleep disturbances increased thoughts or behaviors of suicide or self-harm  increased unusual thinking, such as paranoia or hearing voices    Safety and Wellness:  Take all medicines as directed.  Make no changes unless your doctor suggests them. Follow treatment recommendations.  Refrain from alcohol and non-prescribed drugs.  Ask your support system to help you reduce your access to items that could harm yourself  "or others. If there is a concern for safety, call 911.    Resources:   Crisis Intervention: 850.481.4234 or 642-673-4467 (TTY: 701.393.6400).  Call anytime for help.  National Agency on Mental Illness (www.[place].barb.org): 360.783.7202 or 817-396-7187.  Suicide Awareness Voices of Education (SAVE) (www.save.org): 731-550-IOID (7525)  National Suicide Prevention Line (www.mentalhealthmn.org): 762-776-NAKS (7233)  Mental Health Consumer/Survivor Network of [place] (www.mhcsn.net): 868.399.4732 or 736-302-3916  Mental Health Association of [place] (www.mentalhealth.org): 236.552.3658 or 789-018-6455  Self- Management and Recovery Training., SMART-- Toll free: 516.763.1724  www.Jibe.InSupply  [facility] Crisis Response 861-232-2020  Text 4 Life: txt \"LIFE\" to 81859 for immediate support and crisis intervention  Crisis text line: Text \"[place]\" to 863009. Free, confidential, 24/7.  Crisis Intervention: 994.909.1489 or 583-959-5396. Call anytime for help.   [facility] Crisis Team:  [phone]    General Medication Instructions:   See your medication sheet(s) for instructions.   Take all medicines as directed.  Make no changes unless your doctor suggests them.   Go to all your doctor visits.  Be sure to have all your required lab tests. This way, your medicines can be refilled on time.  Do not use any drugs not prescribed by your doctor.  Avoid alcohol.    Advance Directives:   Scanned document on file with [employer]?    Is document scanned? Pt states no documents  Honoring [facility] Your Rights Handout:    Was more information offered?      The Treatment team has appreciated the opportunity to work with you. If you have any questions or concerns about your recent admission, you can contact the unit which can receive your call 24 hours a day, 7 days a week. They will be able to get in touch with a Provider if needed. The unit number is [phone].      "
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
## 2021-04-02 NOTE — PROGRESS NOTES
La Nena appeared to sleep a total of 6.25 hours this night shift. Up twice for snacks.  No prns given or requested.

## 2021-04-02 NOTE — PLAN OF CARE
"            Work Completed: Met with team. Reviewed patient's chart and progress notes. Patient will be discharged today. Writer assisted patient with transportation arrangement. Writer called [employer] at [phone]. Writer was informed that pt no longer had coverage through them. Writer informed patient that she will have to pay for her cab out of pocket. Patient became upset stating \"I don't have money, I'll just be homeless in [place].\" Writer called [facility] psychotherapy and scheduled a follow up psychiatry appointment for 4/14 at 1 pm.      Discharge plan or goal: Will discharge home today and will follow up with outpatient providers.                 Barriers to discharge: Does not have money for a cab ride.     "

## 2021-04-02 NOTE — PLAN OF CARE
Discharge:  alert and orientated x 4. Irritable with angry affect throughout the day. Denies suicidal thoughts, thoughts of self harm and hallucinations.   Requesting discharge.  Given written discharge instructions with verbal explanation. All questions answered. Verbalized understanding.  Received filled prescription x 1.  Resident informed of positive PCR culture report, patient requesting RX be called into home pharmacy, Care team informed.  Discharge home at 14:00 via taxi. All belongings returned prior to discharge.

## 2021-04-03 LAB
BACTERIA SPEC CULT: ABNORMAL
BACTERIA SPEC CULT: ABNORMAL
SPECIMEN SOURCE: ABNORMAL

## 2021-06-01 ENCOUNTER — RECORDS - HEALTHEAST (OUTPATIENT)
Dept: ADMINISTRATIVE | Facility: CLINIC | Age: 41
End: 2021-06-01

## 2021-06-02 ENCOUNTER — RECORDS - HEALTHEAST (OUTPATIENT)
Dept: ADMINISTRATIVE | Facility: CLINIC | Age: 41
End: 2021-06-02

## 2021-10-24 ENCOUNTER — HEALTH MAINTENANCE LETTER (OUTPATIENT)
Age: 41
End: 2021-10-24

## 2022-04-10 ENCOUNTER — HEALTH MAINTENANCE LETTER (OUTPATIENT)
Age: 42
End: 2022-04-10

## 2022-10-15 ENCOUNTER — HEALTH MAINTENANCE LETTER (OUTPATIENT)
Age: 42
End: 2022-10-15

## 2023-05-27 ENCOUNTER — HEALTH MAINTENANCE LETTER (OUTPATIENT)
Age: 43
End: 2023-05-27

## 2024-03-17 ENCOUNTER — HEALTH MAINTENANCE LETTER (OUTPATIENT)
Age: 44
End: 2024-03-17

## (undated) RX ORDER — KETOROLAC TROMETHAMINE 15 MG/ML
INJECTION, SOLUTION INTRAMUSCULAR; INTRAVENOUS
Status: DISPENSED
Start: 2018-05-04

## (undated) RX ORDER — KETOROLAC TROMETHAMINE 30 MG/ML
INJECTION, SOLUTION INTRAMUSCULAR; INTRAVENOUS
Status: DISPENSED
Start: 2018-05-04

## (undated) RX ORDER — KETOROLAC TROMETHAMINE 15 MG/ML
INJECTION, SOLUTION INTRAMUSCULAR; INTRAVENOUS
Status: DISPENSED
Start: 2018-05-07

## (undated) RX ORDER — ONDANSETRON 2 MG/ML
INJECTION INTRAMUSCULAR; INTRAVENOUS
Status: DISPENSED
Start: 2018-05-07

## (undated) RX ORDER — ONDANSETRON 2 MG/ML
INJECTION INTRAMUSCULAR; INTRAVENOUS
Status: DISPENSED
Start: 2018-05-04